# Patient Record
Sex: FEMALE | Race: WHITE | NOT HISPANIC OR LATINO | URBAN - METROPOLITAN AREA
[De-identification: names, ages, dates, MRNs, and addresses within clinical notes are randomized per-mention and may not be internally consistent; named-entity substitution may affect disease eponyms.]

---

## 2023-10-25 ENCOUNTER — CLINICAL SUPPORT (OUTPATIENT)
Dept: OBSTETRICS AND GYNECOLOGY | Facility: HOSPITAL | Age: 28
End: 2023-10-25
Payer: COMMERCIAL

## 2023-10-25 DIAGNOSIS — N92.6 MISSED MENSES: ICD-10-CM

## 2023-10-25 LAB — PREGNANCY TEST URINE, POC: POSITIVE

## 2023-10-25 ASSESSMENT — ENCOUNTER SYMPTOMS
MUSCULOSKELETAL NEGATIVE: 0
NEUROLOGICAL NEGATIVE: 0
CONSTITUTIONAL NEGATIVE: 0
PSYCHIATRIC NEGATIVE: 0
HEMATOLOGIC/LYMPHATIC NEGATIVE: 0
GASTROINTESTINAL NEGATIVE: 1
CARDIOVASCULAR NEGATIVE: 0
ENDOCRINE NEGATIVE: 0
RESPIRATORY NEGATIVE: 0
ALLERGIC/IMMUNOLOGIC NEGATIVE: 0
EYES NEGATIVE: 0

## 2023-10-25 NOTE — PROGRESS NOTES
Ms. Malagon presents today for a urine pregnancy test, she reports that her last menstrual period was 2023 but reports that they are irregular. She and her partner have been actively trying to conceive. She is a .    Her last unprotected sexual encounter was near the end of October.     She is not on birth control.     In office pregnancy test was positive.    Patient denies any vaginal bleeding or cramping, since her LMP.    Based off of today's findings the patient is approx 6w1d.    Pregnancy warning signs reviewed with patient scheduled for NOB visit on 11/10/2023.      Kacey WADEN, RN, CLC

## 2023-11-06 ENCOUNTER — HOSPITAL ENCOUNTER (EMERGENCY)
Facility: HOSPITAL | Age: 28
Discharge: HOME | End: 2023-11-06
Payer: COMMERCIAL

## 2023-11-06 VITALS
DIASTOLIC BLOOD PRESSURE: 64 MMHG | TEMPERATURE: 97.5 F | HEIGHT: 64 IN | RESPIRATION RATE: 16 BRPM | BODY MASS INDEX: 40.97 KG/M2 | HEART RATE: 88 BPM | OXYGEN SATURATION: 98 % | SYSTOLIC BLOOD PRESSURE: 100 MMHG | WEIGHT: 240 LBS

## 2023-11-06 DIAGNOSIS — J06.9 VIRAL UPPER RESPIRATORY TRACT INFECTION: Primary | ICD-10-CM

## 2023-11-06 DIAGNOSIS — O21.9 NAUSEA AND VOMITING IN PREGNANCY (HHS-HCC): ICD-10-CM

## 2023-11-06 LAB
ALBUMIN SERPL BCP-MCNC: 4.3 G/DL (ref 3.4–5)
ALP SERPL-CCNC: 74 U/L (ref 33–110)
ALT SERPL W P-5'-P-CCNC: 34 U/L (ref 7–45)
ANION GAP SERPL CALC-SCNC: 17 MMOL/L (ref 10–20)
APPEARANCE UR: ABNORMAL
AST SERPL W P-5'-P-CCNC: 20 U/L (ref 9–39)
BASOPHILS # BLD AUTO: 0.07 X10*3/UL (ref 0–0.1)
BASOPHILS NFR BLD AUTO: 0.6 %
BILIRUB SERPL-MCNC: 0.5 MG/DL (ref 0–1.2)
BILIRUB UR STRIP.AUTO-MCNC: NEGATIVE MG/DL
BUN SERPL-MCNC: 5 MG/DL (ref 6–23)
CALCIUM SERPL-MCNC: 9.4 MG/DL (ref 8.6–10.6)
CHLORIDE SERPL-SCNC: 104 MMOL/L (ref 98–107)
CO2 SERPL-SCNC: 21 MMOL/L (ref 21–32)
COLOR UR: YELLOW
CREAT SERPL-MCNC: 0.66 MG/DL (ref 0.5–1.05)
EOSINOPHIL # BLD AUTO: 0.74 X10*3/UL (ref 0–0.7)
EOSINOPHIL NFR BLD AUTO: 6.8 %
ERYTHROCYTE [DISTWIDTH] IN BLOOD BY AUTOMATED COUNT: 14.2 % (ref 11.5–14.5)
FLUAV RNA RESP QL NAA+PROBE: NOT DETECTED
FLUBV RNA RESP QL NAA+PROBE: NOT DETECTED
GFR SERPL CREATININE-BSD FRML MDRD: >90 ML/MIN/1.73M*2
GLUCOSE SERPL-MCNC: 90 MG/DL (ref 74–99)
GLUCOSE UR STRIP.AUTO-MCNC: NEGATIVE MG/DL
HCT VFR BLD AUTO: 42.2 % (ref 36–46)
HGB BLD-MCNC: 14.1 G/DL (ref 12–16)
HOLD SPECIMEN: NORMAL
IMM GRANULOCYTES # BLD AUTO: 0.03 X10*3/UL (ref 0–0.7)
IMM GRANULOCYTES NFR BLD AUTO: 0.3 % (ref 0–0.9)
KETONES UR STRIP.AUTO-MCNC: NEGATIVE MG/DL
LEUKOCYTE ESTERASE UR QL STRIP.AUTO: NEGATIVE
LYMPHOCYTES # BLD AUTO: 2.63 X10*3/UL (ref 1.2–4.8)
LYMPHOCYTES NFR BLD AUTO: 24.1 %
MCH RBC QN AUTO: 29 PG (ref 26–34)
MCHC RBC AUTO-ENTMCNC: 33.4 G/DL (ref 32–36)
MCV RBC AUTO: 87 FL (ref 80–100)
MONOCYTES # BLD AUTO: 0.64 X10*3/UL (ref 0.1–1)
MONOCYTES NFR BLD AUTO: 5.9 %
NEUTROPHILS # BLD AUTO: 6.79 X10*3/UL (ref 1.2–7.7)
NEUTROPHILS NFR BLD AUTO: 62.3 %
NITRITE UR QL STRIP.AUTO: NEGATIVE
NRBC BLD-RTO: 0 /100 WBCS (ref 0–0)
PH UR STRIP.AUTO: 6 [PH]
PLATELET # BLD AUTO: 372 X10*3/UL (ref 150–450)
POTASSIUM SERPL-SCNC: 3.9 MMOL/L (ref 3.5–5.3)
PROT SERPL-MCNC: 7.9 G/DL (ref 6.4–8.2)
PROT UR STRIP.AUTO-MCNC: NEGATIVE MG/DL
RBC # BLD AUTO: 4.86 X10*6/UL (ref 4–5.2)
RBC # UR STRIP.AUTO: NEGATIVE /UL
SARS-COV-2 RNA RESP QL NAA+PROBE: NOT DETECTED
SODIUM SERPL-SCNC: 138 MMOL/L (ref 136–145)
SP GR UR STRIP.AUTO: 1.02
UROBILINOGEN UR STRIP.AUTO-MCNC: <2 MG/DL
WBC # BLD AUTO: 10.9 X10*3/UL (ref 4.4–11.3)

## 2023-11-06 PROCEDURE — 99285 EMERGENCY DEPT VISIT HI MDM: CPT

## 2023-11-06 PROCEDURE — 96374 THER/PROPH/DIAG INJ IV PUSH: CPT

## 2023-11-06 PROCEDURE — 99284 EMERGENCY DEPT VISIT MOD MDM: CPT | Performed by: PHYSICIAN ASSISTANT

## 2023-11-06 PROCEDURE — 96361 HYDRATE IV INFUSION ADD-ON: CPT

## 2023-11-06 PROCEDURE — 2500000004 HC RX 250 GENERAL PHARMACY W/ HCPCS (ALT 636 FOR OP/ED): Mod: SE | Performed by: PHYSICIAN ASSISTANT

## 2023-11-06 PROCEDURE — 85025 COMPLETE CBC W/AUTO DIFF WBC: CPT | Performed by: PHYSICIAN ASSISTANT

## 2023-11-06 PROCEDURE — 87636 SARSCOV2 & INF A&B AMP PRB: CPT | Performed by: PHYSICIAN ASSISTANT

## 2023-11-06 PROCEDURE — 81003 URINALYSIS AUTO W/O SCOPE: CPT | Performed by: PHYSICIAN ASSISTANT

## 2023-11-06 PROCEDURE — 36415 COLL VENOUS BLD VENIPUNCTURE: CPT | Performed by: PHYSICIAN ASSISTANT

## 2023-11-06 PROCEDURE — 99284 EMERGENCY DEPT VISIT MOD MDM: CPT | Mod: 25

## 2023-11-06 PROCEDURE — 80053 COMPREHEN METABOLIC PANEL: CPT | Performed by: PHYSICIAN ASSISTANT

## 2023-11-06 RX ORDER — ONDANSETRON 4 MG/1
4 TABLET, ORALLY DISINTEGRATING ORAL EVERY 8 HOURS PRN
Qty: 30 TABLET | Refills: 0 | Status: ON HOLD | OUTPATIENT
Start: 2023-11-06 | End: 2024-02-21 | Stop reason: ALTCHOICE

## 2023-11-06 RX ORDER — IPRATROPIUM BROMIDE AND ALBUTEROL SULFATE 2.5; .5 MG/3ML; MG/3ML
3 SOLUTION RESPIRATORY (INHALATION) 4 TIMES DAILY PRN
Qty: 20 ML | Refills: 0 | Status: ON HOLD | OUTPATIENT
Start: 2023-11-06 | End: 2024-02-21 | Stop reason: ALTCHOICE

## 2023-11-06 RX ORDER — ONDANSETRON HYDROCHLORIDE 2 MG/ML
4 INJECTION, SOLUTION INTRAVENOUS ONCE
Status: COMPLETED | OUTPATIENT
Start: 2023-11-06 | End: 2023-11-06

## 2023-11-06 RX ORDER — ACETAMINOPHEN 325 MG/1
975 TABLET ORAL ONCE
Status: COMPLETED | OUTPATIENT
Start: 2023-11-06 | End: 2023-11-06

## 2023-11-06 RX ORDER — ACETAMINOPHEN 325 MG/1
650 TABLET ORAL EVERY 6 HOURS PRN
Qty: 30 TABLET | Refills: 0 | Status: SHIPPED | OUTPATIENT
Start: 2023-11-06 | End: 2023-11-10 | Stop reason: ALTCHOICE

## 2023-11-06 RX ORDER — ACETAMINOPHEN 325 MG/1
TABLET ORAL
Status: COMPLETED
Start: 2023-11-06 | End: 2023-11-06

## 2023-11-06 RX ADMIN — SODIUM CHLORIDE, POTASSIUM CHLORIDE, SODIUM LACTATE AND CALCIUM CHLORIDE 1000 ML: 600; 310; 30; 20 INJECTION, SOLUTION INTRAVENOUS at 18:55

## 2023-11-06 RX ADMIN — ACETAMINOPHEN 325 MG: 325 TABLET ORAL at 18:58

## 2023-11-06 RX ADMIN — ONDANSETRON 4 MG: 2 INJECTION INTRAMUSCULAR; INTRAVENOUS at 18:57

## 2023-11-06 RX ADMIN — ACETAMINOPHEN 975 MG: 325 TABLET ORAL at 18:55

## 2023-11-06 ASSESSMENT — PAIN SCALES - GENERAL: PAINLEVEL_OUTOF10: 0 - NO PAIN

## 2023-11-06 ASSESSMENT — LIFESTYLE VARIABLES
HAVE PEOPLE ANNOYED YOU BY CRITICIZING YOUR DRINKING: NO
EVER HAD A DRINK FIRST THING IN THE MORNING TO STEADY YOUR NERVES TO GET RID OF A HANGOVER: NO
HAVE YOU EVER FELT YOU SHOULD CUT DOWN ON YOUR DRINKING: NO
EVER FELT BAD OR GUILTY ABOUT YOUR DRINKING: NO
REASON UNABLE TO ASSESS: NO

## 2023-11-06 ASSESSMENT — COLUMBIA-SUICIDE SEVERITY RATING SCALE - C-SSRS
6. HAVE YOU EVER DONE ANYTHING, STARTED TO DO ANYTHING, OR PREPARED TO DO ANYTHING TO END YOUR LIFE?: NO
2. HAVE YOU ACTUALLY HAD ANY THOUGHTS OF KILLING YOURSELF?: NO
1. IN THE PAST MONTH, HAVE YOU WISHED YOU WERE DEAD OR WISHED YOU COULD GO TO SLEEP AND NOT WAKE UP?: NO

## 2023-11-06 ASSESSMENT — PAIN - FUNCTIONAL ASSESSMENT: PAIN_FUNCTIONAL_ASSESSMENT: 0-10

## 2023-11-06 NOTE — ED PROVIDER NOTES
HPI:  28-year-old female  at 7 weeks gestational age presenting for flulike symptoms.  She has malaise, myalgias, fevers and chills, headaches.  Denies any recent travel or sick contacts.  Denies any abdominal pain, vaginal bleeding, dysuria or urinary frequency.  States she has had some nausea and vomiting without abdominal pains.  States she has not yet been to her first OB appointment.      Physical Exam:   GEN: Vitals noted. NAD  EYES:  EOMs grossly intact, anicteric sclera  CATHI: Mucosa moist.  No tonsillar swelling erythema or exudate  NECK: Supple.  No nuchal rigidity  CARD: RRR  PULMONARY: Moving air well. Clear all lung fields.  ABDOMEN: Soft, no guarding, no rigidity. Nontender. NABS  EXTREMITIES: Full ROM, no pitting edema,   SKIN: Intact, warm and dry  NEURO: Alert and oriented x 3, speech is clear, no obvious deficits noted.  Cranial nerves II through XII intact, nystagmus, negative Romberg, nontoxic gait, intact sensation strength all 4 extremities, good finger-nose-finger, negative cardiac emergency sign      ----------------------------------------------------------------------------------------------------------------------------    MDM:  20-year-old female presenting for flulike symptoms.  On exam she is nontoxic ambulating without difficulty.  Vital signs stable clued afebrile with no tachycardia proximal hypotension.  Lungs CTA BL.  CV RRR.  ABD soft NTTP.  We will give her some fluids and check for COVID-19 and influenza.  Will check urine as well and provide symptomatic support.  Laboratory work was without leukocytosis or anemia, normal electrolytes, negative COVID-19 and influenza, no signs UTI.  On reevaluation she states she feels improved and would like to be discharged home.  She is tolerating p.o. well without difficulty.  She is discharged with prescription for Tylenol and Zofran.  Told to follow-up with her OB/GYN.  Return precautions reviewed.    No orders to display     Labs  Reviewed   CBC WITH AUTO DIFFERENTIAL - Abnormal       Result Value    WBC 10.9      nRBC 0.0      RBC 4.86      Hemoglobin 14.1      Hematocrit 42.2      MCV 87      MCH 29.0      MCHC 33.4      RDW 14.2      Platelets 372      Neutrophils % 62.3      Immature Granulocytes %, Automated 0.3      Lymphocytes % 24.1      Monocytes % 5.9      Eosinophils % 6.8      Basophils % 0.6      Neutrophils Absolute 6.79      Immature Granulocytes Absolute, Automated 0.03      Lymphocytes Absolute 2.63      Monocytes Absolute 0.64      Eosinophils Absolute 0.74 (*)     Basophils Absolute 0.07     COMPREHENSIVE METABOLIC PANEL - Abnormal    Glucose 90      Sodium 138      Potassium 3.9      Chloride 104      Bicarbonate 21      Anion Gap 17      Urea Nitrogen 5 (*)     Creatinine 0.66      eGFR >90      Calcium 9.4      Albumin 4.3      Alkaline Phosphatase 74      Total Protein 7.9      AST 20      Bilirubin, Total 0.5      ALT 34     URINALYSIS WITH REFLEX MICROSCOPIC AND CULTURE - Abnormal    Color, Urine Yellow      Appearance, Urine Hazy (*)     Specific Gravity, Urine 1.017      pH, Urine 6.0      Protein, Urine NEGATIVE      Glucose, Urine NEGATIVE      Blood, Urine NEGATIVE      Ketones, Urine NEGATIVE      Bilirubin, Urine NEGATIVE      Urobilinogen, Urine <2.0      Nitrite, Urine NEGATIVE      Leukocyte Esterase, Urine NEGATIVE     INFLUENZA A AND B PCR - Normal    Flu A Result Not Detected      Flu B Result Not Detected      Narrative:     This assay is an in vitro diagnostic multiplex nucleic acid amplification test for the detection and discrimination of Influenza A & B from nasopharyngeal specimens, and has been validated for use at [facility]. Negative results do not preclude Influenza A/B infections, and should not be used as the sole basis for diagnosis, treatment, or other management decisions. If Influenza A/B and RSV PCR results are negative, testing for Parainfluenza virus, Adenovirus  and Metapneumovirus is routinely performed for [facility] and [facility] care inpatients, and is available on other patients by placing an add-on request.   SARS-COV-2 PCR, SYMPTOMATIC - Normal    Coronavirus 2019, PCR Not Detected      Narrative:     This assay has received FDA Emergency Use Authorization (EUA) and is only authorized for the duration of time that circumstances exist to justify the authorization of the emergency use of in vitro diagnostic tests for the detection of SARS-CoV-2 virus and/or diagnosis of COVID-19 infection under section 564(b)(1) of the Act, 21 U.S.C. 360bbb-3(b)(1). This assay is an in vitro diagnostic nucleic acid amplification test for the qualitative detection of SARS-CoV-2 from nasopharyngeal specimens and has been validated for use at [facility]. Negative results do not preclude COVID-19 infections and should not be used as the sole basis for diagnosis, treatment, or other management decisions.     URINALYSIS WITH REFLEX MICROSCOPIC AND CULTURE    Narrative:     The following orders were created for panel order Urinalysis with Reflex Microscopic and Culture.  Procedure                               Abnormality         Status                     ---------                               -----------         ------                     Urinalysis with Reflex M...[[id number]]  Abnormal            Final result               Extra Urine Gray Tube[[id number]]                            In process                   Please view results for these tests on the individual orders.   EXTRA URINE GRAY TUBE     Diagnoses as of 11/06/23 2029   Viral upper respiratory tract infection   Nausea and vomiting in pregnancy     ----------------------------------------------------------------------------------------------------------------------------    This note was dictated using a speech recognition program.  While an attempt was made at proof reading to minimize errors,  minor errors in transcription may be present call for questions.     Tin Ortiz PA-C  11/06/23 2029

## 2023-11-07 PROBLEM — N92.6 MISSED MENSES: Status: RESOLVED | Noted: 2023-10-25 | Resolved: 2023-11-07

## 2023-11-07 NOTE — DISCHARGE INSTRUCTIONS
Follow-up with your OB/GYN, call [phone] to schedule appointment.  Your COVID-19 and influenza test came back negative.  It is important stay well-hydrated, take the Zofran as needed.  Take Tylenol as needed.

## 2023-11-07 NOTE — PROGRESS NOTES
Brian Malagon is a 28 y.o.  at 7w by US (after today's visit) who presents for an initial prenatal visit. Pt notes she is nervous about pregnancy because of her history of recurrent losses, and would like to get an US as soon as possible.     Patient currently experiencing: nausea/vomitting, was given zofran during a previous ER one week ago, but notes that she has not taken it and will likely not take it because she does not like taking medicine. She reports occasional tension, headaches and neck stiffness. She was seen in the ER last week for, lightheadedness and cold sweats, was dx with URI, and reports that her symptoms have resolved    Bleeding or cramping since LMP: yes - mild cramping, period feeling cramps     Ultrasound completed this pregnancy: 11/10 after visit     Taking prenatal vitamin: Yes    Last pap: , wnl     OB History    Para Term  AB Living   5 1 1   3 1   SAB IAB Ectopic Multiple Live Births   3       1      # Outcome Date GA Lbr Ham/2nd Weight Sex Delivery Anes PTL Lv   5 Current            4 SAB 10/2022           3 Term 18 39w0d  3289 g F Vag-Spont EPI  DAVID   2 SAB  11w0d             Complications: Quiles syndrome   1 SAB  21w0d       ND        Prior pregnancy complications:   Pt's first pregnancy - IUFD at 21 weeks req   Pt's previous provider said that  SAB was found to have Turners syndrome  No complications with her full term pregnancy in 2018    History of hypertension:  No    Past Medical History:   Diagnosis Date    Asthma       Past Surgical History:   Procedure Laterality Date    CHOLECYSTECTOMY      D&C FIRST TRIMESTER / TX INCOMPLETE / MISSED / SEPTIC / INDUCED       D&E SECOND TRIMESTER        Social History     Tobacco Use    Smoking status: Never    Smokeless tobacco: Never   Vaping Use    Vaping Use: Never used   Substance Use Topics    Alcohol use: Not Currently    Drug use: Never        Objective   Physical  Exam  Weight: 110 kg (242 lb)  Pregravid BMI: Could not be calculated  BP: 105/72    Physical Exam  Vitals reviewed.   Constitutional:       Appearance: Normal appearance.   HENT:      Head: Normocephalic.   Pulmonary:      Effort: Pulmonary effort is normal.   Skin:     General: Skin is warm.   Neurological:      Mental Status: She is alert.   Psychiatric:         Mood and Affect: Mood normal.         Behavior: Behavior normal.         Thought Content: Thought content normal.         Judgment: Judgment normal.         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,X  Genetic counseling referral placed          Current Assessment & Plan     Genetics referral placed          Asthma    Overview       Requiring oral steroid medication   Uses albuterol inhaler 1-2x week, notes that she has to use more frequently when she doesn't take her inhaled steroid   Denies history of intubation              Obesity in pregnancy    Overview       Pre-preg BMI 41  Discussed recommended weight gain to 11-20 lbs   Recommended initiation of bASA at 12 wga, pt is amenable         History of IUFD    Overview     Per genetics note in 2018:   Patient's first pregnancy in 2012 resulted in an intrauterine fetal demise at 21-4/7 weeks' gestation. Fetal anatomy ultrasound at 20-3/7 weeks gestation noted a six-day growth lag, low fluid, and a thick placenta. Karyotype was performed and noted to be normal female (46,XX) which was consistent with ultrasound reported gender; however pathology report indicated a male fetus. Therefore it is unclear whether there may have been discordant gender. Anticardiolipin antibody testing, DRVVT screen, and parvovirus testing was all negative.         Current Assessment & Plan     [facility] referral placed at [facility]   Genetics referrals placed          Relevant Orders    Referral to Prenatal Genetics     Nausea and vomiting in pregnancy prior to 22 weeks gestation    Overview     Rx sent for unisom and B6         Relevant Medications    doxylamine (Unisom, doxylamine,) 25 mg tablet    pyridoxine (Vitamin B-6) 25 mg tablet    Supervision of high risk pregnancy in first trimester - Primary    Relevant Medications    prenatal vitamin, iron-folic, 27 mg iron-800 mcg folic acid tablet    Other Relevant Orders    C. Trachomatis / N. Gonorrhoeae, Amplified Detection    CBC Anemia Panel With Reflex,Pregnancy    Hemoglobin A1C    Hepatitis B Surface Antigen    Hepatitis C Antibody    HIV 1/2 Antigen/Antibody Screen with Reflex to Confirmation    Rubella Antibody, IgG    TRICH VAGINALIS, AMPLIFIED    Syphilis Screen with Reflex    Type And Screen    Varicella Zoster Antibody, IgG    Referral to Maternal Fetal Medicine    POC pregnancy, urine (Completed)    Urine Culture       Plan   NOB plan: New OB resources provided and reviewed with particular attention to dietary, travel, and medication restrictions  Oriented to practice, discussed need for only MD care given hx of 21 week loss   Reviewed bleeding precautions, warning signs, when to call provider; phone number provided  Discussed bASA for PEC prophylaxis starting at 12 weeks   Routine NOB labs ordered  Discussed Centering Pregnancy with patient, pt declined   Transfer care to MD due to history of recurrent pregnancy loss   Reviewed reasons to call CNM on-call: vaginal bleeding, strong pelvic pain, or any questions/concerns  RTC in 4 weeks or prn  *Next visit:  Flu vaccine     ISHAN Carrillo APRN-CNM

## 2023-11-09 PROBLEM — T78.03XA SEAFOOD ALLERGY, ANAPHYLAXIS: Status: ACTIVE | Noted: 2017-04-11

## 2023-11-09 PROBLEM — O03.9 SPONTANEOUS ABORTION (HHS-HCC): Status: ACTIVE | Noted: 2023-11-09

## 2023-11-09 PROBLEM — O09.299: Status: ACTIVE | Noted: 2023-11-09

## 2023-11-09 PROBLEM — N93.9 ABNORMAL UTERINE BLEEDING (AUB): Status: ACTIVE | Noted: 2023-11-09

## 2023-11-09 PROBLEM — N96 RECURRENT PREGNANCY LOSS WITHOUT CURRENT PREGNANCY: Status: ACTIVE | Noted: 2023-11-09

## 2023-11-09 PROBLEM — N96 RECURRENT PREGNANCY LOSS: Status: ACTIVE | Noted: 2022-10-21

## 2023-11-09 PROBLEM — Z98.890 S/P DILATATION AND CURETTAGE: Status: ACTIVE | Noted: 2023-11-09

## 2023-11-09 PROBLEM — E66.01 MORBID (SEVERE) OBESITY DUE TO EXCESS CALORIES (MULTI): Status: ACTIVE | Noted: 2022-09-15

## 2023-11-09 PROBLEM — O02.1 MISSED ABORTION (HHS-HCC): Status: ACTIVE | Noted: 2023-11-09

## 2023-11-09 PROBLEM — F17.200 TOBACCO USE DISORDER: Status: ACTIVE | Noted: 2018-07-14

## 2023-11-09 RX ORDER — MOMETASONE FUROATE AND FORMOTEROL FUMARATE DIHYDRATE 200; 5 UG/1; UG/1
2 AEROSOL RESPIRATORY (INHALATION) 2 TIMES DAILY
COMMUNITY
Start: 2020-10-26

## 2023-11-09 RX ORDER — ALBUTEROL SULFATE 0.83 MG/ML
SOLUTION RESPIRATORY (INHALATION)
COMMUNITY
Start: 2020-10-26

## 2023-11-09 RX ORDER — ALBUTEROL SULFATE 90 UG/1
2 AEROSOL, METERED RESPIRATORY (INHALATION) EVERY 6 HOURS PRN
COMMUNITY
Start: 2016-01-23

## 2023-11-09 RX ORDER — PRENATAL VIT NO.126/IRON/FOLIC 28MG-0.8MG
1 TABLET ORAL DAILY
Status: ON HOLD | COMMUNITY
Start: 2022-08-31 | End: 2024-02-21 | Stop reason: ALTCHOICE

## 2023-11-09 RX ORDER — EPINEPHRINE 0.3 MG/.3ML
0.3 INJECTION SUBCUTANEOUS AS NEEDED
COMMUNITY
Start: 2017-04-11

## 2023-11-09 RX ORDER — PNV NO.95/FERROUS FUM/FOLIC AC 28MG-0.8MG
TABLET ORAL
COMMUNITY
Start: 2022-11-20 | End: 2024-01-17 | Stop reason: WASHOUT

## 2023-11-09 RX ORDER — IBUPROFEN 800 MG/1
TABLET ORAL
COMMUNITY
Start: 2022-10-07 | End: 2023-11-10 | Stop reason: ALTCHOICE

## 2023-11-09 RX ORDER — CETIRIZINE HYDROCHLORIDE 10 MG/1
1 TABLET ORAL DAILY
COMMUNITY
Start: 2023-06-16

## 2023-11-10 ENCOUNTER — HOSPITAL ENCOUNTER (OUTPATIENT)
Dept: RADIOLOGY | Facility: HOSPITAL | Age: 28
Discharge: HOME | End: 2023-11-10
Payer: COMMERCIAL

## 2023-11-10 ENCOUNTER — INITIAL PRENATAL (OUTPATIENT)
Dept: OBSTETRICS AND GYNECOLOGY | Facility: HOSPITAL | Age: 28
End: 2023-11-10
Payer: COMMERCIAL

## 2023-11-10 VITALS — SYSTOLIC BLOOD PRESSURE: 105 MMHG | DIASTOLIC BLOOD PRESSURE: 72 MMHG | WEIGHT: 242 LBS | BODY MASS INDEX: 41.54 KG/M2

## 2023-11-10 DIAGNOSIS — O36.80X0 PREGNANCY WITH INCONCLUSIVE FETAL VIABILITY (HHS-HCC): ICD-10-CM

## 2023-11-10 DIAGNOSIS — O21.9 NAUSEA AND VOMITING IN PREGNANCY PRIOR TO 22 WEEKS GESTATION (HHS-HCC): ICD-10-CM

## 2023-11-10 DIAGNOSIS — O99.210 OBESITY IN PREGNANCY (HHS-HCC): ICD-10-CM

## 2023-11-10 DIAGNOSIS — O09.299: ICD-10-CM

## 2023-11-10 DIAGNOSIS — J45.40 MODERATE PERSISTENT ASTHMA, UNSPECIFIED WHETHER COMPLICATED (HHS-HCC): ICD-10-CM

## 2023-11-10 DIAGNOSIS — Z34.91 INITIAL OBSTETRIC VISIT IN FIRST TRIMESTER (HHS-HCC): ICD-10-CM

## 2023-11-10 DIAGNOSIS — O09.91 SUPERVISION OF HIGH RISK PREGNANCY IN FIRST TRIMESTER (HHS-HCC): Primary | ICD-10-CM

## 2023-11-10 DIAGNOSIS — Z87.59 HISTORY OF IUFD: ICD-10-CM

## 2023-11-10 PROBLEM — N96 HISTORY OF RECURRENT MISCARRIAGES: Status: ACTIVE | Noted: 2023-11-10

## 2023-11-10 PROBLEM — J45.909 ASTHMA (HHS-HCC): Status: ACTIVE | Noted: 2023-11-10

## 2023-11-10 PROBLEM — O00.01: Status: ACTIVE | Noted: 2023-11-10

## 2023-11-10 PROBLEM — O02.1 MISSED ABORTION (HHS-HCC): Status: RESOLVED | Noted: 2023-11-09 | Resolved: 2023-11-10

## 2023-11-10 PROBLEM — O26.20 HISTORY OF RECURRENT ABORTION, CURRENTLY PREGNANT (HHS-HCC): Status: ACTIVE | Noted: 2023-11-10

## 2023-11-10 PROBLEM — N96 RECURRENT PREGNANCY LOSS WITHOUT CURRENT PREGNANCY: Status: RESOLVED | Noted: 2023-11-09 | Resolved: 2023-11-10

## 2023-11-10 PROBLEM — N93.9 ABNORMAL UTERINE BLEEDING (AUB): Status: RESOLVED | Noted: 2023-11-09 | Resolved: 2023-11-10

## 2023-11-10 PROBLEM — Z98.890 S/P DILATATION AND CURETTAGE: Status: RESOLVED | Noted: 2023-11-09 | Resolved: 2023-11-10

## 2023-11-10 PROBLEM — F17.200 TOBACCO USE DISORDER: Status: RESOLVED | Noted: 2018-07-14 | Resolved: 2023-11-10

## 2023-11-10 PROBLEM — O03.9 SPONTANEOUS ABORTION (HHS-HCC): Status: RESOLVED | Noted: 2023-11-09 | Resolved: 2023-11-10

## 2023-11-10 PROBLEM — O36.4XX0: Status: ACTIVE | Noted: 2023-11-10

## 2023-11-10 LAB — PREGNANCY TEST URINE, POC: POSITIVE

## 2023-11-10 PROCEDURE — 87661 TRICHOMONAS VAGINALIS AMPLIF: CPT | Mod: 59 | Performed by: ADVANCED PRACTICE MIDWIFE

## 2023-11-10 PROCEDURE — 87086 URINE CULTURE/COLONY COUNT: CPT | Performed by: ADVANCED PRACTICE MIDWIFE

## 2023-11-10 PROCEDURE — 99214 OFFICE O/P EST MOD 30 MIN: CPT | Performed by: ADVANCED PRACTICE MIDWIFE

## 2023-11-10 PROCEDURE — 76817 TRANSVAGINAL US OBSTETRIC: CPT

## 2023-11-10 PROCEDURE — 76801 OB US < 14 WKS SINGLE FETUS: CPT | Performed by: OBSTETRICS & GYNECOLOGY

## 2023-11-10 PROCEDURE — 76801 OB US < 14 WKS SINGLE FETUS: CPT

## 2023-11-10 PROCEDURE — 76817 TRANSVAGINAL US OBSTETRIC: CPT | Performed by: OBSTETRICS & GYNECOLOGY

## 2023-11-10 PROCEDURE — 87800 DETECT AGNT MULT DNA DIREC: CPT | Performed by: ADVANCED PRACTICE MIDWIFE

## 2023-11-10 RX ORDER — PYRIDOXINE HCL (VITAMIN B6) 25 MG
25 TABLET ORAL 4 TIMES DAILY PRN
Qty: 30 TABLET | Refills: 1 | Status: SHIPPED | OUTPATIENT
Start: 2023-11-10 | End: 2023-12-10

## 2023-11-10 ASSESSMENT — EDINBURGH POSTNATAL DEPRESSION SCALE (EPDS)
I HAVE BEEN SO UNHAPPY THAT I HAVE HAD DIFFICULTY SLEEPING: NOT AT ALL
THINGS HAVE BEEN GETTING ON TOP OF ME: NO, I HAVE BEEN COPING AS WELL AS EVER
I HAVE LOOKED FORWARD WITH ENJOYMENT TO THINGS: AS MUCH AS I EVER DID
I HAVE BEEN SO UNHAPPY THAT I HAVE BEEN CRYING: ONLY OCCASIONALLY
THE THOUGHT OF HARMING MYSELF HAS OCCURRED TO ME: NEVER
I HAVE BLAMED MYSELF UNNECESSARILY WHEN THINGS WENT WRONG: NO, NEVER
I HAVE FELT SAD OR MISERABLE: NO, NOT AT ALL
I HAVE FELT SCARED OR PANICKY FOR NO GOOD REASON: NO, NOT AT ALL
I HAVE BEEN ANXIOUS OR WORRIED FOR NO GOOD REASON: NO, NOT AT ALL

## 2023-11-11 LAB
C TRACH RRNA SPEC QL NAA+PROBE: NEGATIVE
N GONORRHOEA DNA SPEC QL PROBE+SIG AMP: NEGATIVE
T VAGINALIS RRNA SPEC QL NAA+PROBE: NEGATIVE

## 2023-11-12 LAB — BACTERIA UR CULT: NO GROWTH

## 2023-11-27 ENCOUNTER — LAB (OUTPATIENT)
Dept: LAB | Facility: LAB | Age: 28
End: 2023-11-27
Payer: COMMERCIAL

## 2023-11-27 ENCOUNTER — HOSPITAL ENCOUNTER (OUTPATIENT)
Dept: RADIOLOGY | Facility: HOSPITAL | Age: 28
Discharge: HOME | End: 2023-11-27
Payer: COMMERCIAL

## 2023-11-27 DIAGNOSIS — O09.91 SUPERVISION OF HIGH RISK PREGNANCY IN FIRST TRIMESTER (HHS-HCC): ICD-10-CM

## 2023-11-27 DIAGNOSIS — Z36.82 NUCHAL TRANSLUCENCY OF FETUS ON PRENATAL ULTRASOUND (HHS-HCC): ICD-10-CM

## 2023-11-27 LAB
ABO GROUP (TYPE) IN BLOOD: NORMAL
ANTIBODY SCREEN: NORMAL
ERYTHROCYTE [DISTWIDTH] IN BLOOD BY AUTOMATED COUNT: 13.6 % (ref 11.5–14.5)
EST. AVERAGE GLUCOSE BLD GHB EST-MCNC: 97 MG/DL
HBA1C MFR BLD: 5 %
HBV SURFACE AG SERPL QL IA: NONREACTIVE
HCT VFR BLD AUTO: 43.3 % (ref 36–46)
HCV AB SER QL: NONREACTIVE
HGB BLD-MCNC: 14.3 G/DL (ref 12–16)
HIV 1+2 AB+HIV1 P24 AG SERPL QL IA: NONREACTIVE
MCH RBC QN AUTO: 29.4 PG (ref 26–34)
MCHC RBC AUTO-ENTMCNC: 33 G/DL (ref 32–36)
MCV RBC AUTO: 89 FL (ref 80–100)
NRBC BLD-RTO: 0 /100 WBCS (ref 0–0)
PLATELET # BLD AUTO: 393 X10*3/UL (ref 150–450)
RBC # BLD AUTO: 4.86 X10*6/UL (ref 4–5.2)
REFLEX ADDED, ANEMIA PANEL: NORMAL
RH FACTOR (ANTIGEN D): NORMAL
RUBV IGG SERPL IA-ACNC: 0.5 IA
RUBV IGG SERPL QL IA: NEGATIVE
T PALLIDUM AB SER QL: NONREACTIVE
VARICELLA ZOSTER IGG INDEX: 0.5 IA
VZV IGG SER QL IA: NEGATIVE
WBC # BLD AUTO: 11.7 X10*3/UL (ref 4.4–11.3)

## 2023-11-27 PROCEDURE — 87340 HEPATITIS B SURFACE AG IA: CPT

## 2023-11-27 PROCEDURE — 86317 IMMUNOASSAY INFECTIOUS AGENT: CPT

## 2023-11-27 PROCEDURE — 86803 HEPATITIS C AB TEST: CPT

## 2023-11-27 PROCEDURE — 83036 HEMOGLOBIN GLYCOSYLATED A1C: CPT

## 2023-11-27 PROCEDURE — 86850 RBC ANTIBODY SCREEN: CPT

## 2023-11-27 PROCEDURE — 86901 BLOOD TYPING SEROLOGIC RH(D): CPT

## 2023-11-27 PROCEDURE — 86780 TREPONEMA PALLIDUM: CPT

## 2023-11-27 PROCEDURE — 87389 HIV-1 AG W/HIV-1&-2 AB AG IA: CPT

## 2023-11-27 PROCEDURE — 85027 COMPLETE CBC AUTOMATED: CPT

## 2023-11-27 PROCEDURE — 86787 VARICELLA-ZOSTER ANTIBODY: CPT

## 2023-11-27 PROCEDURE — 36415 COLL VENOUS BLD VENIPUNCTURE: CPT

## 2023-11-27 PROCEDURE — 76801 OB US < 14 WKS SINGLE FETUS: CPT

## 2023-11-27 PROCEDURE — 76801 OB US < 14 WKS SINGLE FETUS: CPT | Performed by: OBSTETRICS & GYNECOLOGY

## 2023-11-27 PROCEDURE — 86900 BLOOD TYPING SEROLOGIC ABO: CPT

## 2023-12-05 ENCOUNTER — CLINICAL SUPPORT (OUTPATIENT)
Dept: GENETICS | Facility: HOSPITAL | Age: 28
End: 2023-12-05
Payer: COMMERCIAL

## 2023-12-05 ENCOUNTER — LAB (OUTPATIENT)
Dept: LAB | Facility: LAB | Age: 28
End: 2023-12-05
Payer: COMMERCIAL

## 2023-12-05 VITALS
WEIGHT: 240 LBS | BODY MASS INDEX: 40.97 KG/M2 | HEIGHT: 64 IN | SYSTOLIC BLOOD PRESSURE: 110 MMHG | DIASTOLIC BLOOD PRESSURE: 74 MMHG

## 2023-12-05 DIAGNOSIS — Z13.79 ENCOUNTER FOR GENETIC SCREENING: ICD-10-CM

## 2023-12-05 PROCEDURE — 36415 COLL VENOUS BLD VENIPUNCTURE: CPT

## 2023-12-05 PROCEDURE — GENMD PR GENETICS VISIT (MEDICAID/MEDICARE): Performed by: GENETIC COUNSELOR, MS

## 2023-12-05 ASSESSMENT — PAIN SCALES - GENERAL: PAINLEVEL: 0-NO PAIN

## 2023-12-08 ENCOUNTER — HOSPITAL ENCOUNTER (EMERGENCY)
Facility: HOSPITAL | Age: 28
Discharge: HOME | End: 2023-12-08
Payer: COMMERCIAL

## 2023-12-08 VITALS
WEIGHT: 240 LBS | BODY MASS INDEX: 40.97 KG/M2 | HEIGHT: 64 IN | RESPIRATION RATE: 16 BRPM | TEMPERATURE: 98 F | HEART RATE: 77 BPM | DIASTOLIC BLOOD PRESSURE: 67 MMHG | OXYGEN SATURATION: 99 % | SYSTOLIC BLOOD PRESSURE: 127 MMHG

## 2023-12-08 DIAGNOSIS — O20.9 VAGINAL BLEEDING AFFECTING EARLY PREGNANCY (HHS-HCC): Primary | ICD-10-CM

## 2023-12-08 LAB
ABO GROUP (TYPE) IN BLOOD: NORMAL
ALBUMIN SERPL BCP-MCNC: 4.2 G/DL (ref 3.4–5)
ALP SERPL-CCNC: 71 U/L (ref 33–110)
ALT SERPL W P-5'-P-CCNC: 30 U/L (ref 7–45)
ANION GAP SERPL CALC-SCNC: 14 MMOL/L (ref 10–20)
ANTIBODY SCREEN: NORMAL
APPEARANCE UR: ABNORMAL
AST SERPL W P-5'-P-CCNC: 21 U/L (ref 9–39)
B-HCG SERPL-ACNC: ABNORMAL MIU/ML
BASOPHILS # BLD AUTO: 0.03 X10*3/UL (ref 0–0.1)
BASOPHILS NFR BLD AUTO: 0.3 %
BILIRUB SERPL-MCNC: 0.4 MG/DL (ref 0–1.2)
BILIRUB UR STRIP.AUTO-MCNC: NEGATIVE MG/DL
BUN SERPL-MCNC: 6 MG/DL (ref 6–23)
CALCIUM SERPL-MCNC: 9.9 MG/DL (ref 8.6–10.6)
CAOX CRY #/AREA UR COMP ASSIST: ABNORMAL /HPF
CHLORIDE SERPL-SCNC: 104 MMOL/L (ref 98–107)
CLUE CELLS SPEC QL WET PREP: NORMAL
CO2 SERPL-SCNC: 24 MMOL/L (ref 21–32)
COLOR UR: YELLOW
CREAT SERPL-MCNC: 0.53 MG/DL (ref 0.5–1.05)
EOSINOPHIL # BLD AUTO: 0.47 X10*3/UL (ref 0–0.7)
EOSINOPHIL NFR BLD AUTO: 4.1 %
ERYTHROCYTE [DISTWIDTH] IN BLOOD BY AUTOMATED COUNT: 13.6 % (ref 11.5–14.5)
GFR SERPL CREATININE-BSD FRML MDRD: >90 ML/MIN/1.73M*2
GLUCOSE SERPL-MCNC: 82 MG/DL (ref 74–99)
GLUCOSE UR STRIP.AUTO-MCNC: NEGATIVE MG/DL
HCT VFR BLD AUTO: 41.9 % (ref 36–46)
HGB BLD-MCNC: 14 G/DL (ref 12–16)
HOLD SPECIMEN: NORMAL
IMM GRANULOCYTES # BLD AUTO: 0.16 X10*3/UL (ref 0–0.7)
IMM GRANULOCYTES NFR BLD AUTO: 1.4 % (ref 0–0.9)
KETONES UR STRIP.AUTO-MCNC: NEGATIVE MG/DL
LEUKOCYTE ESTERASE UR QL STRIP.AUTO: NEGATIVE
LIPASE SERPL-CCNC: 21 U/L (ref 9–82)
LYMPHOCYTES # BLD AUTO: 2.52 X10*3/UL (ref 1.2–4.8)
LYMPHOCYTES NFR BLD AUTO: 22.2 %
MCH RBC QN AUTO: 29.7 PG (ref 26–34)
MCHC RBC AUTO-ENTMCNC: 33.4 G/DL (ref 32–36)
MCV RBC AUTO: 89 FL (ref 80–100)
MONOCYTES # BLD AUTO: 0.61 X10*3/UL (ref 0.1–1)
MONOCYTES NFR BLD AUTO: 5.4 %
MUCOUS THREADS #/AREA URNS AUTO: ABNORMAL /LPF
NEUTROPHILS # BLD AUTO: 7.55 X10*3/UL (ref 1.2–7.7)
NEUTROPHILS NFR BLD AUTO: 66.6 %
NITRITE UR QL STRIP.AUTO: NEGATIVE
NRBC BLD-RTO: 0 /100 WBCS (ref 0–0)
PH UR STRIP.AUTO: 5 [PH]
PLATELET # BLD AUTO: 346 X10*3/UL (ref 150–450)
POTASSIUM SERPL-SCNC: 4 MMOL/L (ref 3.5–5.3)
PROT SERPL-MCNC: 7.9 G/DL (ref 6.4–8.2)
PROT UR STRIP.AUTO-MCNC: NEGATIVE MG/DL
RBC # BLD AUTO: 4.71 X10*6/UL (ref 4–5.2)
RBC # UR STRIP.AUTO: ABNORMAL /UL
RBC #/AREA URNS AUTO: >20 /HPF
RH FACTOR (ANTIGEN D): NORMAL
SODIUM SERPL-SCNC: 138 MMOL/L (ref 136–145)
SP GR UR STRIP.AUTO: 1.02
SQUAMOUS #/AREA URNS AUTO: ABNORMAL /HPF
T VAGINALIS SPEC QL WET PREP: NORMAL
UROBILINOGEN UR STRIP.AUTO-MCNC: <2 MG/DL
WBC # BLD AUTO: 11.3 X10*3/UL (ref 4.4–11.3)
WBC #/AREA URNS AUTO: ABNORMAL /HPF
WBC VAG QL WET PREP: NORMAL
YEAST VAG QL WET PREP: NORMAL

## 2023-12-08 PROCEDURE — 85025 COMPLETE CBC W/AUTO DIFF WBC: CPT

## 2023-12-08 PROCEDURE — 84702 CHORIONIC GONADOTROPIN TEST: CPT

## 2023-12-08 PROCEDURE — 87210 SMEAR WET MOUNT SALINE/INK: CPT

## 2023-12-08 PROCEDURE — 99284 EMERGENCY DEPT VISIT MOD MDM: CPT

## 2023-12-08 PROCEDURE — 83690 ASSAY OF LIPASE: CPT

## 2023-12-08 PROCEDURE — 36415 COLL VENOUS BLD VENIPUNCTURE: CPT

## 2023-12-08 PROCEDURE — 80053 COMPREHEN METABOLIC PANEL: CPT

## 2023-12-08 PROCEDURE — 99283 EMERGENCY DEPT VISIT LOW MDM: CPT | Mod: 25

## 2023-12-08 PROCEDURE — 81001 URINALYSIS AUTO W/SCOPE: CPT

## 2023-12-08 PROCEDURE — 99285 EMERGENCY DEPT VISIT HI MDM: CPT

## 2023-12-08 PROCEDURE — 86900 BLOOD TYPING SEROLOGIC ABO: CPT

## 2023-12-08 ASSESSMENT — COLUMBIA-SUICIDE SEVERITY RATING SCALE - C-SSRS
1. IN THE PAST MONTH, HAVE YOU WISHED YOU WERE DEAD OR WISHED YOU COULD GO TO SLEEP AND NOT WAKE UP?: NO
6. HAVE YOU EVER DONE ANYTHING, STARTED TO DO ANYTHING, OR PREPARED TO DO ANYTHING TO END YOUR LIFE?: NO
2. HAVE YOU ACTUALLY HAD ANY THOUGHTS OF KILLING YOURSELF?: NO

## 2023-12-08 NOTE — ED TRIAGE NOTES
Pt states 11 weeks OB. Pt c/o vag bleeding that began at 1200 today. Pt states high risk. LMP 2023. .

## 2023-12-08 NOTE — ED PROCEDURE NOTE
Procedure    Performed by: Francoise oPllard MD  Authorized by: Marcellus Rico PA-C        Genitourinary Indications: obstetric vaginal bleeding            Comments: Fetal cardiac activity identified                 Francoise Pollard MD  Resident  12/08/23 4775

## 2023-12-08 NOTE — ED PROVIDER NOTES
HPI   Chief Complaint   Patient presents with    Vaginal Bleeding - Pregnant     11 weeks OB       Limitations to History: None    HPI:-year-old female G5, P1 at approximately 11 weeks gestation from last menstrual period with a previously confirmed intrauterine pregnancy who presents to the emergency room with painless vaginal bleeding that started yesterday.  She states that she started noticing some light bleeding while wiping.  Patient denies any constitutional symptoms such as fevers, chills, cough, nasal congestion.  Patient denies any nausea, vomiting, or changes in bowel habits.  Patient denies any urinary frequency, hesitancy, dysuria, or hematuria.  Patient denies any vaginal itching or discharge.  Patient is agreeable to do a pelvic exam but would not like STD testing at this time.    Additional History Obtained from: None    12 point review of systems was performed and is negative unless otherwise specified    ------------------------------------------------------------------------------------------------------------------------------------------  Physical Exam:    VS: As documented in the triage note and EMR flowsheet from this visit were reviewed.    CONSTITUTIONAL: Well appearing, well nourished, awake, alert, oriented to person, place, time/situation and in no apparent distress.   EYES: Clear bilaterally, pupils equal, round and reactive to light.   CARDIOVASCULAR: Normal rate, regular rhythm.  Heart sounds S1, S2.  No murmurs, rubs or gallops.  RESPIRATORY: Breath sounds clear and equal bilaterally. No wheezes or rhonchi.   GASTROINTESTINAL: Abdomen soft, non-distended, no rebound, no guarding.  No tenderness to palpation in all 4 quadrants.  No suprapubic or CVA tenderness.  Negative Burton's point, Rovsing, psoas, Madison sign.  : A pelvic exam was performed on patient.  A female chaperone in the room was Madelaine Keane.  There is normal external vaginal anatomy with warm mucous membranes of the  inner vaginal walls.  The cervical os is visualized to be closed.  There is a small amount of blood draining from the cervical os without pooling.  On bimanual exam, there is no cervical motion tenderness or adnexal pain.  MUSCULOSKELETAL: Spine appears normal, range of motion is not limited, no muscle or joint tenderness.   NEUROLOGICAL: Alert and oriented, no focal deficits, no motor or sensory deficits.   SKIN: Skin normal color for race, warm, dry and intact. No evidence of trauma.   PSYCHIATRIC: Alert and oriented to person, place, time/situation. normal mood and affect. No apparent risk to self or others.     ------------------------------------------------------------------------------------------------------------------------------------------    Medical Decision Making:    This is a 28-year-old female G5, P1 at 11 weeks gestation who presents to the emergency room with a pregnancy concern.  Patient remained stable throughout course of care.  My abdominal exam is benign and lowers my concern for intra-abdominal processes such as cholecystitis, pancreatitis, appendicitis, or bowel obstruction.  Pelvic exam is reassuring and lowers my concern for TOA, torsion, ectopic pregnancy, or PID.  A point-of-care ultrasound was utilized by the resident who was able to ascertain a normal uterine pregnancy with a fetal heart rate of 171.  There was no evidence of massive hemorrhage or pelvic free fluid.  Wet prep, CMP, type and screen, urinalysis with reflex to culture, CBC with differential, lipase, and a quantitative hCG was ordered. hCG is 105,909.  Lipase is unremarkable.  Wet prep is unremarkable.  CBC was unremarkable.  CMP was unremarkable.  Urinalysis was unremarkable.  Given the reassuring point-of-care ultrasound, there is lower concern for fetal demise and patient should follow-up with her OB/GYN as soon as possible.  It is possible that patient is in the early stage of a miscarriage.  Patient was given strict  return precautions including increased bleeding, increased abdominal pain, development of constitutional symptoms, etc.  Patient has prenatal vitamins, Zofran, and Tylenol at home.  Patient has a follow-up appointment with OB/GYN in 2 weeks.  I strongly encouraged the patient follows up sooner if possible with them.  Patient was agreeable to this plan and had no further questions.  I did provide patient with the number for the [facility] for outpatient follow-up.  Patient will be discharged home.  Patient understands that if symptoms worsen or change in any way, they should return for immediate medical attention.  Patient understands that they should follow-up with their primary care physician within the next week to follow-up for vaginal bleeding in pregnancy.  Patient was stable upon discharge.      External Records Reviewed: I reviewed recent and relevant outside records including: Previous provider notes      Escalation of Care:  Appropriate for outpatient follow-up with primary care provider, OB/GYN    Social Determinants Affecting Care:  None    Prescription Drug Consideration: None      CARLA Dickens, PA-C  [facility]                            No data recorded                Patient History   Past Medical History:   Diagnosis Date    Asthma      Past Surgical History:   Procedure Laterality Date    CHOLECYSTECTOMY      D&C FIRST TRIMESTER / TX INCOMPLETE / MISSED / SEPTIC / INDUCED       D&E SECOND TRIMESTER       Family History   Problem Relation Name Age of Onset    COPD Father      Sleep apnea Father       Social History     Tobacco Use    Smoking status: Never    Smokeless tobacco: Never   Vaping Use    Vaping Use: Never used   Substance Use Topics    Alcohol use: Not Currently    Drug use: Never       Physical Exam   ED Triage Vitals [23 1318]   Temp Heart Rate Resp BP   36.7 °C (98 °F) 88 16 134/76       SpO2 Temp Source Heart Rate Source Patient Position   98 % Oral Monitor --      BP Location FiO2 (%)     -- --       Physical Exam    ED Course & MDM        Medical Decision Making      Procedure  Procedures     Marcellus Rico PA-C  12/08/23 1279

## 2023-12-12 ENCOUNTER — TELEPHONE (OUTPATIENT)
Dept: GENETICS | Facility: CLINIC | Age: 28
End: 2023-12-12
Payer: COMMERCIAL

## 2023-12-12 NOTE — PROGRESS NOTES
Brian Malagon is a 28 y.o.  at 12w0d with a working estimated date of delivery of 2024, by Ultrasound who presents for a routine prenatal visit.     She denies current vaginal bleeding, abdominal pain, leakage of fluid.     Patient was seen in ED on  for spotting, since then she states spotting has decreased. Patient denies any vaginal itching/burning. RH +.     Patient is amenable to flu shot today.     Nausea and vomiting improved. Unisom helps a lot at night. Patient is eating enough. She has food at home.     Objective   Physical Exam  Weight: 108 kg (237 lb), Pregravid BMI: 41.61  Expected Total Weight Gain: 5 kg (11 lb)-9 kg (19 lb)   BP: 111/74       FHTs not heard with doppler, +FM and WNL FHTs seen on BSU     Problem List Items Addressed This Visit       Recurrent pregnancy loss - Primary    Overview     Saw genetics          Obesity in pregnancy    Overview       Pre-preg BMI 41  Discussed recommended weight gain to 11-20 lbs   Recommended initiation of bASA at 12 wga, pt is amenable         Nausea and vomiting in pregnancy prior to 22 weeks gestation    Overview     Rx sent for unisom and B6         Supervision of high risk pregnancy in first trimester    Overview     Desired provider in labor: [] CNM  [] Physician  [x] Dated by: 7w US not c/w LMP  [x] Initial BMI: 41  [] Prenatal Labs: needs Hemoglobin electro   [x] Rh status: A+  [x] Genetic Screening:  rr cfDNA  [] Baby ASA    [] Anatomy US:  [x] Fetal Sex: male   [] Patient added to BirthTracks  [] 1hr GCT at 24-28wks:  [] 3 hr GTT (if indicated):  [] Rhogam (if indicated):   [] Fetal Surveillance (if indicated):    [] Tdap (27-36wks):  [x] Flu Shot: 12/15/23  [] COVID vaccine:     [] Breastfeeding:  [] Pain management during labor:   [] Postpartum Birth control method:     [] GBS at 36 wks:  [] 39 weeks discussion of IOL vs. Expectant management:  [] Mode of delivery:                 -1st tri    -Reviewed  reasons to call CNM on-call: vaginal bleeding, loss of fluid, severe pelvic pain, or any questions/concerns  -RTC in 4 weeks or prn    CHAUNCEY Duong-MAGUE

## 2023-12-12 NOTE — TELEPHONE ENCOUNTER
Patient was notified of her negative cell-free DNA results by phone.  Limitations of the screen were reviewed.  Patient elected to know suspected fetal sex.  Patient verbalized understanding.  We discussed that [employer] ECS is still pending.    Michelle Hughes MS  Licensed Genetic Counselor

## 2023-12-14 NOTE — PROGRESS NOTES
"Eunice Malagon is a 28 y.o. old, W7V5LC2 female who was approximately 10 weeks and 4 days pregnant at the time of our appointment with an EDC of 24.  She was seen with her daughter to discuss her genetic screening and testing options due to recurrent pregnancy loss.      PAST HISTORY:  The patient has a personal history of obesity and asthma.  The patient previously had genetic counseling from Cary Aparicio MS [facility] due to her history of a 21 week IUFD and a 7 week pregnancy loss with Monosomy X.  Per Genetic Consultation note Cary Aparicio MS, [facility] 2018: \"Patient's first pregnancy in  resulted in an intrauterine fetal demise at 21-4/7 weeks' gestation. Fetal anatomy ultrasound at 20-3/7 weeks gestation noted a six-day growth lag, low fluid, and a thick placenta. Karyotype was performed and noted to be normal female (46,XX) which was consistent with ultrasound reported gender; however pathology report indicated a male fetus. THerefore it is unclear whether there may have been discordant gender. Anticardiolipin antibody testing, DRVVT screen, and parvovirus testing was all negative. More recently, the patient had a first trimester pregnancy loss in 2016. Karyotype indicated 45,X...\"    Pregnancy history:  Pregnancy #1- FOB #1, IUFD at 21 weeks, 46,XX, female on karyotype, ultrasound female, autopsy indicated that genitals appeared male  Pregnancy #2- FOB #2, SAB at 7 weeks, POC analysis Monosomy X  Pregnancy #3- FOB#2, healthy 5 year old daughter  Pregnancy #4- FOB#2, SAB at 7 weeks, no genetic testing on the products of conception from this pregnancy loss     The patient has not had any screening or diagnostic testing for aneuploidy in her pregnancy thus far.    FAMILY HISTORY:  Medical and family histories were reviewed and the following concerns regarding this pregnancy were apparent:      Ms. Malagon:  Personal history- as noted above   Mother- asthma, kidney stones  Maternal half-aunt- chronic kidney " disease of unknown etiology, lupus  Maternal grandmother- six pregnancy losses  Father- COPD, sleep apnea,      Her partner, Claudy:  Personal history- ADHD, dyslexia     The remainder of the family history was negative for birth defects, intellectual disability, recurrent pregnancy loss, or recognized inherited conditions.  Consanguinity was denied.  The Pedigree is scanned in the Media tab and is available for a full review of the family history.      ETHNICITY:  The patient reported that she is of  (countries of origin are unknown) ethnicity.  She reported that her partner ins of [place]i ethnicity.  Ashkenazi Jain Ancestry was denied.    We discussed the availability, benefits, and limitations of carrier screening for cystic fibrosis (CF), spinal muscular atrophy (SMA), and hemoglobinopathies/thalassemias. We reviewed the carrier frequencies of these conditions, varied clinical manifestations, and their autosomal recessive inheritance. The patient has already had negative carrier screening for hemoglobinopathies and thalassemias via CBC and hemoglobin electrophoresis and these results were reviewed. If both members of the couple are found to be carriers for the same autosomal recessive condition, there is a 25% chance for an affected child and prenatal diagnosis would be available. Negative carrier screening does not rule out the possibility of being a carrier.  screening is available for CF, hemoglobinopathies, and thalassemias, and SMA.  We also discussed the availability of more expanded/pan ethnic carrier screening for additional, primarily autosomal recessive, conditions. We discussed the pros and cons of expanded carrier screening including the higher likelihood of being identified as a carrier for at least one condition on a larger panel. Approximately 4% of couples are found to be at risk to have a child with a genetic disorder based on this screening. In rare cases, expanded  "carrier screening results may have health implications for the tested individual.      After careful consideration, the patient elected expanded carrier screening to [facility] for 556 genes.      COUNSELING:  The following information was discussed with your patient:    1. Per previous genetic counseling note Cary Aparicio, MS [facility] 1/8/2018 \"1.  At this time, it is unclear why the patient's first pregnancy resulted in intrauterine fetal demise. Second trimester IUFD may be associated with maternal conditions, placental abnormalities, infection, and fetal malformations or genetic disorders. No fetal malformation were identified on ultrasound, and patient was screened for some common maternal conditions associated with fetal demise, parvovirus infection, and also had chromosome analysis on products of conception. A specific underlying cause was not identified from this testing; therefore risk of recurrence for similar concern in the current or in a future pregnancy is unknown...\"      We reviewed that for this pregnancy genetic testing was 46,XX and that ultrasound anatomy appeared female.  The autopsy report indicated the the fetus was phenotypically male.  We discussed that possible reasons that this can occur include the genetic testing had maternal cell contamination, there was a disorder of sexual development, the appearance of the genitals were difficult to discern on autopsy, and other various reasons.      2. The patient had a previous pregnancy with Quiles syndrome (Monosomy X). We discussed that approximately 98-99% of fetuses' with Quiles syndrome are miscarried or stillborn. \"In general, Quiles syndrome is considered to be a sporadic condition. Recurrence in subsequent pregnancies is rare, but has occurred. It is assumed that the likelihood of recurrence is similar to that in the general population (in other words, no increased risk for couples who have had a previous affected pregnancy). To our knowledge, " "there also is no increased risk for other types of genetic abnormalities.\" <https://rarediseases.info.nih.gov/diseases/7831/barnes-syndrome/cases/10401>     3. The general population risk of 3-5% for birth defects in every pregnancy.    4. Chromosomes, genes, non-disjunction, and common aneuploidies.  Based on the age of 29 at EDC alone, at this gestation, the risk for the fetus to have Down syndrome is approximately 1 in 770.  The combined risk for the fetus to have Trisomy 21/18/13 is approximately 1 in 515.  This does not include copy number variation, mosaicism, single gene disorders, translocations, and marker chromosomes.     5. The availability of maternal serum screening during the first trimester.    6. The availability, benefits and limitations of ultrasound study. An ultrasound study is recommended at 12-14 weeks gestation for assessment of nuchal translucency and again at 19-20 weeks gestation to survey fetal organs. An ultrasound study in the second trimester can identify 50% of Down syndrome cases and 80-90% of trisomy 18 or trisomy 13 cases.     7. The availability, benefits and limitations of standard cell-free DNA screening.  We discussed the methodology for this screen, which includes using cell-free DNA obtained from a mother's blood (derived from the placenta) to screen for the presence of common chromosomal abnormalities. Depending on the laboratory used, there is a >99% sensitivity for Down syndrome, at least 97.4% sensitivity for trisomy 18, and at least 91% sensitivity for trisomy 13.  Specificity for these trisomies is >99%. Although sensitivity and specificity rates are high, in our experience the positive predictive value is dependent on many factors; whereas false negative results are rare.  In addition, anticoagulants, maternal chromosome abnormality, fibroids or malignancy may impact results and/or be associated with inconclusive or other abnormal findings.  This is not a diagnostic " test.  Therefore, in the event of an abnormal result, prenatal diagnosis through amniocentesis is recommended to confirm the findings, if confirmation is desired.  Results take approximately 7-10 days.      8. The methods, benefits, limitations and risks of CVS.  There is an approximate 1 in 200 risk of complications including a 1 in 400 risk for miscarriage. The approximate 1% risk of confined placental mosaicism in CVS was discussed.     9. The methods, benefits, limitations and risks of amniocentesis.  There is an approximate 1 in 400 risk of complications including a 1 in 800 risk of miscarriage.    10. Additional Family History Information:  The patient has a personal history of obesity and asthma.  The patient has a family history of COPD, sleep apnea, lupus, asthma, and kidney stones. The patient's partner has a personal history of ADHD and dyslexia. Often, these conditions are due to a combination of genetic and environmental factors (which often remain unknown).  The risk to have them often depends on the amount and degree of affected family members.  It also depends on if an underlying genetic cause of these conditions can be identified.   With this history, the couple and their offspring are at an increased risk for the disorders in their respective families and this information should be shared with all relevant primary care providers.      The patient's maternal grandmother has a personal history of recurrent pregnancy loss.  There are many different causes of miscarriage.  In 5% of couples that have experienced two or more unexplained miscarriages, a chromosomal rearrangement can be found in one of the partners.  There are various other causes of miscarriage including (but not limited to) maternal infection, hormonal imbalance, structural uterine abnormalities, and spontaneous chromosomal abnormalities.  A genetics evaluation is recommended for any individual who has experienced two or more unexplained  losses.  This information is generally used for reproductive purposes.      The patient reported that her maternal half-aunt has chronic kidney disease of unspecified etiology.  We reviewed that we are learning more about the genetics of kidney disorders, and sometimes these conditions run in families. General genetic counseling is available for any family member with kidney failure and an appointment can be made by calling [phone]. If an underlying genetic etiology in the family is determined, precise recurrence risks could be provided, and testing for other family members may be available if clinically indicated.      DISPOSITION:  The patient stated that she understood the above information and elected to proceed with standard cell-free DNA screening to [facility].  The patient also elected expanded carrier screening to [facility] for 556 genes.  Diagnostic testing in the form of CVS and genetic amniocentesis was declined.      We recommend a follow up appointment in approximately 4 weeks to review the results of the patient's expanded carrier screening.  We recommend an NT ultrasound at 12 weeks.  We recommend an anatomy ultrasound at 19 weeks.     Thank you for allowing us to participate in the care of your patient.  Should you or your patient have any questions, please do not hesitate to contact our office at [phone].    Licensed Genetic Counselor Michelle Hughes MS, [facility] spent 43 minutes with the patient with greater than 50% of the time spent in face to face counseling.     Sincerely,    Michelle Hughes MS  Licensed Genetic Counselor    Reviewed by:  Dr. Yadiel Holloway MD  Maternal Fetal Medicine Specialist

## 2023-12-15 ENCOUNTER — ROUTINE PRENATAL (OUTPATIENT)
Dept: OBSTETRICS AND GYNECOLOGY | Facility: HOSPITAL | Age: 28
End: 2023-12-15
Payer: COMMERCIAL

## 2023-12-15 VITALS — DIASTOLIC BLOOD PRESSURE: 74 MMHG | BODY MASS INDEX: 40.68 KG/M2 | WEIGHT: 237 LBS | SYSTOLIC BLOOD PRESSURE: 111 MMHG

## 2023-12-15 DIAGNOSIS — Z3A.12 12 WEEKS GESTATION OF PREGNANCY (HHS-HCC): Primary | ICD-10-CM

## 2023-12-15 DIAGNOSIS — O09.91 SUPERVISION OF HIGH RISK PREGNANCY IN FIRST TRIMESTER (HHS-HCC): ICD-10-CM

## 2023-12-15 DIAGNOSIS — O99.210 OBESITY IN PREGNANCY (HHS-HCC): ICD-10-CM

## 2023-12-15 DIAGNOSIS — N96 RECURRENT PREGNANCY LOSS: ICD-10-CM

## 2023-12-15 DIAGNOSIS — O21.9 NAUSEA AND VOMITING IN PREGNANCY PRIOR TO 22 WEEKS GESTATION (HHS-HCC): ICD-10-CM

## 2023-12-15 PROBLEM — Z87.59 HISTORY OF IUFD: Status: RESOLVED | Noted: 2023-11-10 | Resolved: 2023-12-15

## 2023-12-15 PROCEDURE — 99213 OFFICE O/P EST LOW 20 MIN: CPT | Performed by: ADVANCED PRACTICE MIDWIFE

## 2023-12-15 PROCEDURE — 90686 IIV4 VACC NO PRSV 0.5 ML IM: CPT | Performed by: ADVANCED PRACTICE MIDWIFE

## 2023-12-15 PROCEDURE — 99213 OFFICE O/P EST LOW 20 MIN: CPT | Mod: TH | Performed by: ADVANCED PRACTICE MIDWIFE

## 2023-12-15 RX ORDER — NAPROXEN SODIUM 220 MG/1
81 TABLET, FILM COATED ORAL 2 TIMES DAILY
Qty: 60 TABLET | Refills: 11 | Status: SHIPPED | OUTPATIENT
Start: 2023-12-15 | End: 2024-12-14

## 2023-12-15 NOTE — TELEPHONE ENCOUNTER
----- Message from Myrna Mayers APRN-CNM sent at 12/15/2023 12:26 PM EST -----  Can you please call Eunice and ask her if she'd like to be on ASA 81mg? I forgot to ask her at her visit today. This is a medication that is recommended to help ensure her placenta works as well as possible to decrease her chance of developing high blood for baby being small.  Her risk factors include her weight, and her recurrent pregnancy loss.  If she has more questions about this and happy to talk to her.    Thanks,  Myrna     Called patient to discuss taking aspirin in pregnancy to reduce risk of preeclampsia  Patient amendable to taking aspirin  Pharmacy verified  Medication pended to provider for signature  Encouraged patient to call office with any questions or concerns  Jesenia Hamm RN

## 2023-12-20 ENCOUNTER — HOSPITAL ENCOUNTER (OUTPATIENT)
Dept: RADIOLOGY | Facility: HOSPITAL | Age: 28
Discharge: HOME | End: 2023-12-20
Payer: COMMERCIAL

## 2023-12-20 DIAGNOSIS — Z36.82 NUCHAL TRANSLUCENCY OF FETUS ON PRENATAL ULTRASOUND (HHS-HCC): ICD-10-CM

## 2023-12-20 PROCEDURE — 76815 OB US LIMITED FETUS(S): CPT | Performed by: OBSTETRICS & GYNECOLOGY

## 2023-12-20 PROCEDURE — 76801 OB US < 14 WKS SINGLE FETUS: CPT

## 2023-12-22 ENCOUNTER — APPOINTMENT (OUTPATIENT)
Dept: RADIOLOGY | Facility: HOSPITAL | Age: 28
End: 2023-12-22
Payer: COMMERCIAL

## 2023-12-29 DIAGNOSIS — O21.9 NAUSEA AND VOMITING IN PREGNANCY PRIOR TO 22 WEEKS GESTATION (HHS-HCC): ICD-10-CM

## 2024-01-02 ENCOUNTER — APPOINTMENT (OUTPATIENT)
Dept: GENETICS | Facility: HOSPITAL | Age: 29
End: 2024-01-02
Payer: COMMERCIAL

## 2024-01-13 NOTE — PROGRESS NOTES
Brian Malagon is a 28 y.o.  at 16w5d with a working estimated date of delivery of 2024, by Ultrasound who presents for a routine prenatal visit.     She denies vaginal bleeding, abdominal pain, leakage of fluid. Nausea better. That makes her happy, but also nervous. She has enough food at home, just food aversions.     Objective   Physical Exam  Weight: 106 kg (234 lb), Pregravid BMI: 41.61  Expected Total Weight Gain: 5 kg (11 lb)-9 kg (19 lb)   BP: 112/75  Fetal Heart Rate: 140      Problem List Items Addressed This Visit       Obesity in pregnancy    Overview     BMI = 41.61 at NOB  Fetal surveillance BMI >40 at NOB:  Growth US at 30 and 36 wks  BPP or NST weekly 34 wks to delivery    Timing of delivery: 39 0/7 - 39 6/7 wks  *BMI >= 50 at any time in pregnancy: Deliver at Level 4           RESOLVED: Nausea and vomiting in pregnancy prior to 22 weeks gestation    Overview     Rx sent for unisom and B6         Supervision of high risk pregnancy in first trimester    Overview     Desired provider in labor: [] CNM  [] Physician  [x] Dated by: 7w US not c/w LMP  [x] Initial BMI: 41  [] Prenatal Labs: needs Hemoglobin electro   [x] Rh status: A+  [x] Genetic Screening:  rr cfDNA  [x] Baby ASA : rx sent 12/15    [] Anatomy US:  [x] Fetal Sex: male   [] Patient added to BirthTracks  [] 1hr GCT at 24-28wks:  [] 3 hr GTT (if indicated):  [] Rhogam (if indicated):   [] Fetal Surveillance (if indicated):    [] Tdap (27-36wks):  [x] Flu Shot: 12/15/23  [] COVID vaccine:     [] Breastfeeding:  [] Pain management during labor:   [] Postpartum Birth control method:     [] GBS at 36 wks:  [] 39 weeks discussion of IOL vs. Expectant management:  [] Mode of delivery:               Other Visit Diagnoses       16 weeks gestation of pregnancy    -  Primary            -Anatomy US ordered;   -Reviewed reasons to call CNM on-call: vaginal bleeding, loss of fluid, severe pelvic pain, or any  questions/concerns  -RTC in 4 weeks or prn      CHAUNCEY Duong-MAGUE

## 2024-01-17 ENCOUNTER — ROUTINE PRENATAL (OUTPATIENT)
Dept: OBSTETRICS AND GYNECOLOGY | Facility: HOSPITAL | Age: 29
End: 2024-01-17
Payer: COMMERCIAL

## 2024-01-17 VITALS — WEIGHT: 234 LBS | SYSTOLIC BLOOD PRESSURE: 112 MMHG | DIASTOLIC BLOOD PRESSURE: 75 MMHG | BODY MASS INDEX: 40.17 KG/M2

## 2024-01-17 DIAGNOSIS — O09.91 SUPERVISION OF HIGH RISK PREGNANCY IN FIRST TRIMESTER (HHS-HCC): ICD-10-CM

## 2024-01-17 DIAGNOSIS — Z3A.16 16 WEEKS GESTATION OF PREGNANCY (HHS-HCC): Primary | ICD-10-CM

## 2024-01-17 DIAGNOSIS — O99.210 OBESITY IN PREGNANCY (HHS-HCC): ICD-10-CM

## 2024-01-17 DIAGNOSIS — O21.9 NAUSEA AND VOMITING IN PREGNANCY PRIOR TO 22 WEEKS GESTATION (HHS-HCC): ICD-10-CM

## 2024-01-17 PROCEDURE — 99213 OFFICE O/P EST LOW 20 MIN: CPT | Mod: TH | Performed by: ADVANCED PRACTICE MIDWIFE

## 2024-01-17 PROCEDURE — 99213 OFFICE O/P EST LOW 20 MIN: CPT | Performed by: ADVANCED PRACTICE MIDWIFE

## 2024-02-09 ENCOUNTER — HOSPITAL ENCOUNTER (OUTPATIENT)
Dept: RADIOLOGY | Facility: HOSPITAL | Age: 29
Discharge: HOME | End: 2024-02-09
Payer: COMMERCIAL

## 2024-02-09 DIAGNOSIS — O99.210 OBESITY IN PREGNANCY (HHS-HCC): ICD-10-CM

## 2024-02-09 DIAGNOSIS — Z36.89 SCREENING, ANTENATAL, FOR FETAL ANATOMIC SURVEY (HHS-HCC): ICD-10-CM

## 2024-02-09 PROCEDURE — 76811 OB US DETAILED SNGL FETUS: CPT

## 2024-02-10 NOTE — PROGRESS NOTES
Brian Malagon is a 28 y.o.  at 20w5d with a working estimated date of delivery of 2024, by Ultrasound who presents for a routine prenatal visit.     Doing well. No questions/concerns. She denies vaginal bleeding, leakage of fluid, decreased fetal movements, or contractions.    Objective   Physical Exam  Weight: 107 kg (236 lb)  Expected Total Weight Gain: 5 kg (11 lb)-9 kg (19 lb)   Pregravid BMI: 41.61  BP: 117/76  Fetal Heart Rate: 150 Fundal Height (cm): 20 cm    Problem List Items Addressed This Visit       Obesity in pregnancy    Overview     BMI = 41.61 at NOB  Fetal surveillance BMI >40 at NOB:  Growth US at 30 and 36 wks  BPP or NST weekly 34 wks to delivery    Timing of delivery: 39 0/7 - 39 6/7 wks  *BMI >= 50 at any time in pregnancy: Deliver at Level 4           Supervision of high risk pregnancy in first trimester    Overview     Desired provider in labor: [] CNM  [] Physician  [x] Dated by: 7w US not c/w LMP  [x] Initial BMI: 41  [x] Prenatal Labs: WNL  [x] Rh status: A+  [x] Genetic Screening:  rr cfDNA  [x] Baby ASA : rx sent 12/15    [] Anatomy US:  [x] Fetal Sex: male , yes to circ  [] Patient added to BirthTracks  [] 1hr GCT at 24-28wks:  [] 3 hr GTT (if indicated):  [] Fetal Surveillance (if indicated):    [] Tdap (27-36wks):  [x] Flu Shot: 12/15/23    [] Breastfeeding:  [] Pain management during labor:   [] Postpartum Birth control method:     [] GBS at 36 wks:  [] 39 weeks discussion of IOL vs. Expectant management:  [] Mode of delivery:                 Anatomy US scheduled (repeat anatomy) for    -Reviewed reasons to call CNM on-call: decreased fetal movement, vaginal bleeding, loss of fluid, increased pelvic pain/contractions, or any questions/concerns  -RTC in 4 weeks or prn    Camila GALLOS    I was present with the PA student who participated in the documentation of this note. I have personally seen and re-examined the patient and performed the medical  decision-making components (assessment and plan of care). I have reviewed the PA student documentation and verified the findings in the note as written with additions or exceptions as stated in the body of this note.    ISHAN Duong

## 2024-02-14 ENCOUNTER — ROUTINE PRENATAL (OUTPATIENT)
Dept: OBSTETRICS AND GYNECOLOGY | Facility: HOSPITAL | Age: 29
End: 2024-02-14
Payer: COMMERCIAL

## 2024-02-14 VITALS — DIASTOLIC BLOOD PRESSURE: 76 MMHG | BODY MASS INDEX: 40.51 KG/M2 | WEIGHT: 236 LBS | SYSTOLIC BLOOD PRESSURE: 117 MMHG

## 2024-02-14 DIAGNOSIS — O99.210 OBESITY IN PREGNANCY (HHS-HCC): ICD-10-CM

## 2024-02-14 DIAGNOSIS — Z3A.20 20 WEEKS GESTATION OF PREGNANCY (HHS-HCC): Primary | ICD-10-CM

## 2024-02-14 DIAGNOSIS — O09.91 SUPERVISION OF HIGH RISK PREGNANCY IN FIRST TRIMESTER (HHS-HCC): ICD-10-CM

## 2024-02-14 PROCEDURE — 99213 OFFICE O/P EST LOW 20 MIN: CPT | Mod: TH | Performed by: ADVANCED PRACTICE MIDWIFE

## 2024-02-14 PROCEDURE — 99213 OFFICE O/P EST LOW 20 MIN: CPT | Performed by: ADVANCED PRACTICE MIDWIFE

## 2024-02-20 ENCOUNTER — APPOINTMENT (OUTPATIENT)
Dept: CARDIOLOGY | Facility: HOSPITAL | Age: 29
End: 2024-02-20
Payer: COMMERCIAL

## 2024-02-20 ENCOUNTER — HOSPITAL ENCOUNTER (OUTPATIENT)
Facility: HOSPITAL | Age: 29
Setting detail: OBSERVATION
Discharge: HOME | End: 2024-02-21
Attending: OBSTETRICS & GYNECOLOGY | Admitting: STUDENT IN AN ORGANIZED HEALTH CARE EDUCATION/TRAINING PROGRAM
Payer: COMMERCIAL

## 2024-02-20 DIAGNOSIS — R00.2 PALPITATIONS: Primary | ICD-10-CM

## 2024-02-20 LAB
ERYTHROCYTE [DISTWIDTH] IN BLOOD BY AUTOMATED COUNT: 14.2 % (ref 11.5–14.5)
HCT VFR BLD AUTO: 35.6 % (ref 36–46)
HGB BLD-MCNC: 11.8 G/DL (ref 12–16)
MCH RBC QN AUTO: 30.2 PG (ref 26–34)
MCHC RBC AUTO-ENTMCNC: 33.1 G/DL (ref 32–36)
MCV RBC AUTO: 91 FL (ref 80–100)
NRBC BLD-RTO: 0 /100 WBCS (ref 0–0)
PLATELET # BLD AUTO: 286 X10*3/UL (ref 150–450)
RBC # BLD AUTO: 3.91 X10*6/UL (ref 4–5.2)
WBC # BLD AUTO: 12.6 X10*3/UL (ref 4.4–11.3)

## 2024-02-20 PROCEDURE — 84484 ASSAY OF TROPONIN QUANT: CPT

## 2024-02-20 PROCEDURE — 85027 COMPLETE CBC AUTOMATED: CPT

## 2024-02-20 PROCEDURE — 83880 ASSAY OF NATRIURETIC PEPTIDE: CPT

## 2024-02-20 PROCEDURE — 93005 ELECTROCARDIOGRAM TRACING: CPT

## 2024-02-20 PROCEDURE — 80053 COMPREHEN METABOLIC PANEL: CPT

## 2024-02-20 PROCEDURE — 36415 COLL VENOUS BLD VENIPUNCTURE: CPT

## 2024-02-20 PROCEDURE — 84443 ASSAY THYROID STIM HORMONE: CPT

## 2024-02-20 PROCEDURE — 83735 ASSAY OF MAGNESIUM: CPT

## 2024-02-20 PROCEDURE — 99285 EMERGENCY DEPT VISIT HI MDM: CPT | Mod: 25

## 2024-02-20 SDOH — ECONOMIC STABILITY: HOUSING INSECURITY: DO YOU FEEL UNSAFE GOING BACK TO THE PLACE WHERE YOU ARE LIVING?: NO

## 2024-02-20 SDOH — HEALTH STABILITY: MENTAL HEALTH: HAVE YOU USED ANY PRESCRIPTION DRUGS OTHER THAN PRESCRIBED IN THE PAST 12 MONTHS?: NO

## 2024-02-20 SDOH — SOCIAL STABILITY: SOCIAL INSECURITY: PHYSICAL ABUSE: DENIES

## 2024-02-20 SDOH — SOCIAL STABILITY: SOCIAL INSECURITY: HAS ANYONE EVER THREATENED TO HURT YOUR FAMILY OR YOUR PETS?: NO

## 2024-02-20 SDOH — SOCIAL STABILITY: SOCIAL INSECURITY: DOES ANYONE TRY TO KEEP YOU FROM HAVING/CONTACTING OTHER FRIENDS OR DOING THINGS OUTSIDE YOUR HOME?: NO

## 2024-02-20 SDOH — HEALTH STABILITY: MENTAL HEALTH: WISH TO BE DEAD (PAST 1 MONTH): NO

## 2024-02-20 SDOH — HEALTH STABILITY: MENTAL HEALTH: SUICIDAL BEHAVIOR (LIFETIME): NO

## 2024-02-20 SDOH — SOCIAL STABILITY: SOCIAL INSECURITY: VERBAL ABUSE: DENIES

## 2024-02-20 SDOH — SOCIAL STABILITY: SOCIAL INSECURITY: DO YOU FEEL ANYONE HAS EXPLOITED OR TAKEN ADVANTAGE OF YOU FINANCIALLY OR OF YOUR PERSONAL PROPERTY?: NO

## 2024-02-20 SDOH — HEALTH STABILITY: MENTAL HEALTH: HAVE YOU USED ANY SUBSTANCES (CANABIS, COCAINE, HEROIN, HALLUCINOGENS, INHALANTS, ETC.) IN THE PAST 12 MONTHS?: NO

## 2024-02-20 SDOH — SOCIAL STABILITY: SOCIAL INSECURITY: ARE THERE ANY APPARENT SIGNS OF INJURIES/BEHAVIORS THAT COULD BE RELATED TO ABUSE/NEGLECT?: NO

## 2024-02-20 SDOH — SOCIAL STABILITY: SOCIAL INSECURITY: ABUSE SCREEN: ADULT

## 2024-02-20 SDOH — HEALTH STABILITY: MENTAL HEALTH: NON-SPECIFIC ACTIVE SUICIDAL THOUGHTS (PAST 1 MONTH): NO

## 2024-02-20 SDOH — HEALTH STABILITY: MENTAL HEALTH: WERE YOU ABLE TO COMPLETE ALL THE BEHAVIORAL HEALTH SCREENINGS?: YES

## 2024-02-20 SDOH — SOCIAL STABILITY: SOCIAL INSECURITY: ARE YOU OR HAVE YOU BEEN THREATENED OR ABUSED PHYSICALLY, EMOTIONALLY, OR SEXUALLY BY ANYONE?: NO

## 2024-02-20 SDOH — SOCIAL STABILITY: SOCIAL INSECURITY: HAVE YOU HAD THOUGHTS OF HARMING ANYONE ELSE?: NO

## 2024-02-20 ASSESSMENT — LIFESTYLE VARIABLES
HOW MANY STANDARD DRINKS CONTAINING ALCOHOL DO YOU HAVE ON A TYPICAL DAY: PATIENT DOES NOT DRINK
SKIP TO QUESTIONS 9-10: 1
HOW OFTEN DO YOU HAVE A DRINK CONTAINING ALCOHOL: NEVER
AUDIT-C TOTAL SCORE: 0
HOW OFTEN DO YOU HAVE 6 OR MORE DRINKS ON ONE OCCASION: NEVER
AUDIT-C TOTAL SCORE: 0

## 2024-02-20 ASSESSMENT — PAIN SCALES - GENERAL: PAINLEVEL: 0 - NO PAIN

## 2024-02-20 ASSESSMENT — PATIENT HEALTH QUESTIONNAIRE - PHQ9
1. LITTLE INTEREST OR PLEASURE IN DOING THINGS: NOT AT ALL
2. FEELING DOWN, DEPRESSED OR HOPELESS: NOT AT ALL
SUM OF ALL RESPONSES TO PHQ9 QUESTIONS 1 & 2: 0

## 2024-02-21 VITALS
DIASTOLIC BLOOD PRESSURE: 55 MMHG | TEMPERATURE: 97.7 F | WEIGHT: 234.35 LBS | HEART RATE: 95 BPM | SYSTOLIC BLOOD PRESSURE: 107 MMHG | RESPIRATION RATE: 19 BRPM | HEIGHT: 64 IN | BODY MASS INDEX: 40.01 KG/M2 | OXYGEN SATURATION: 97 %

## 2024-02-21 PROBLEM — R00.2 PALPITATIONS: Status: ACTIVE | Noted: 2024-02-21

## 2024-02-21 PROBLEM — R00.2 PALPITATIONS: Status: RESOLVED | Noted: 2024-02-21 | Resolved: 2024-02-21

## 2024-02-21 LAB
ABO GROUP (TYPE) IN BLOOD: NORMAL
ALBUMIN SERPL BCP-MCNC: 3.7 G/DL (ref 3.4–5)
ALP SERPL-CCNC: 54 U/L (ref 33–110)
ALT SERPL W P-5'-P-CCNC: 12 U/L (ref 7–45)
ANION GAP SERPL CALC-SCNC: 16 MMOL/L (ref 10–20)
ANTIBODY SCREEN: NORMAL
AST SERPL W P-5'-P-CCNC: 13 U/L (ref 9–39)
ATRIAL RATE: 75 BPM
BILIRUB SERPL-MCNC: 0.4 MG/DL (ref 0–1.2)
BILIRUBIN, POC: ABNORMAL
BLOOD URINE, POC: NEGATIVE
BNP SERPL-MCNC: 28 PG/ML (ref 0–99)
BUN SERPL-MCNC: 4 MG/DL (ref 6–23)
CALCIUM SERPL-MCNC: 9.1 MG/DL (ref 8.6–10.6)
CARDIAC TROPONIN I PNL SERPL HS: <3 NG/L (ref 0–34)
CARDIAC TROPONIN I PNL SERPL HS: <3 NG/L (ref 0–34)
CHLORIDE SERPL-SCNC: 104 MMOL/L (ref 98–107)
CLARITY, POC: CLEAR
CO2 SERPL-SCNC: 21 MMOL/L (ref 21–32)
COLOR, POC: YELLOW
CREAT SERPL-MCNC: 0.51 MG/DL (ref 0.5–1.05)
EGFRCR SERPLBLD CKD-EPI 2021: >90 ML/MIN/1.73M*2
GLUCOSE SERPL-MCNC: 82 MG/DL (ref 74–99)
GLUCOSE URINE, POC: NEGATIVE
KETONES, POC: POSITIVE
LEUKOCYTE EST, POC: NEGATIVE
MAGNESIUM SERPL-MCNC: 1.62 MG/DL (ref 1.6–2.4)
NITRITE, POC: NEGATIVE
P AXIS: 30 DEGREES
P OFFSET: 201 MS
P ONSET: 158 MS
PH, POC: 6.5
POC APPEARANCE OF BODY FLUID: CLEAR
POTASSIUM SERPL-SCNC: 3.2 MMOL/L (ref 3.5–5.3)
PR INTERVAL: 110 MS
PROT SERPL-MCNC: 6.4 G/DL (ref 6.4–8.2)
Q ONSET: 213 MS
QRS COUNT: 12 BEATS
QRS DURATION: 76 MS
QT INTERVAL: 420 MS
QTC CALCULATION(BAZETT): 469 MS
QTC FREDERICIA: 452 MS
R AXIS: -11 DEGREES
RH FACTOR (ANTIGEN D): NORMAL
SODIUM SERPL-SCNC: 138 MMOL/L (ref 136–145)
SPECIFIC GRAVITY, POC: 1.03
T AXIS: 56 DEGREES
T OFFSET: 423 MS
TSH SERPL-ACNC: 2.6 MIU/L (ref 0.44–3.98)
URINE PROTEIN, POC: ABNORMAL
UROBILINOGEN, POC: 1
VENTRICULAR RATE: 75 BPM

## 2024-02-21 PROCEDURE — G0378 HOSPITAL OBSERVATION PER HR: HCPCS

## 2024-02-21 PROCEDURE — 2500000002 HC RX 250 W HCPCS SELF ADMINISTERED DRUGS (ALT 637 FOR MEDICARE OP, ALT 636 FOR OP/ED)

## 2024-02-21 PROCEDURE — 81002 URINALYSIS NONAUTO W/O SCOPE: CPT

## 2024-02-21 PROCEDURE — 36415 COLL VENOUS BLD VENIPUNCTURE: CPT

## 2024-02-21 PROCEDURE — 99214 OFFICE O/P EST MOD 30 MIN: CPT

## 2024-02-21 PROCEDURE — 86901 BLOOD TYPING SEROLOGIC RH(D): CPT

## 2024-02-21 PROCEDURE — 2500000001 HC RX 250 WO HCPCS SELF ADMINISTERED DRUGS (ALT 637 FOR MEDICARE OP): Mod: SE

## 2024-02-21 PROCEDURE — 99203 OFFICE O/P NEW LOW 30 MIN: CPT | Performed by: STUDENT IN AN ORGANIZED HEALTH CARE EDUCATION/TRAINING PROGRAM

## 2024-02-21 PROCEDURE — 2500000004 HC RX 250 GENERAL PHARMACY W/ HCPCS (ALT 636 FOR OP/ED)

## 2024-02-21 RX ORDER — ONDANSETRON 4 MG/1
4 TABLET, FILM COATED ORAL EVERY 6 HOURS PRN
Status: DISCONTINUED | OUTPATIENT
Start: 2024-02-21 | End: 2024-02-21 | Stop reason: HOSPADM

## 2024-02-21 RX ORDER — SIMETHICONE 80 MG
80 TABLET,CHEWABLE ORAL 4 TIMES DAILY PRN
Status: DISCONTINUED | OUTPATIENT
Start: 2024-02-21 | End: 2024-02-21 | Stop reason: HOSPADM

## 2024-02-21 RX ORDER — HYDRALAZINE HYDROCHLORIDE 20 MG/ML
5 INJECTION INTRAMUSCULAR; INTRAVENOUS ONCE AS NEEDED
Status: DISCONTINUED | OUTPATIENT
Start: 2024-02-21 | End: 2024-02-21 | Stop reason: HOSPADM

## 2024-02-21 RX ORDER — MAGNESIUM CHLORIDE 64 MG
64 TABLET, DELAYED RELEASE (ENTERIC COATED) ORAL ONCE
Status: DISCONTINUED | OUTPATIENT
Start: 2024-02-21 | End: 2024-02-21

## 2024-02-21 RX ORDER — ALBUTEROL SULFATE 90 UG/1
2 AEROSOL, METERED RESPIRATORY (INHALATION) EVERY 6 HOURS PRN
Status: CANCELLED | OUTPATIENT
Start: 2024-02-21

## 2024-02-21 RX ORDER — NIFEDIPINE 10 MG/1
10 CAPSULE ORAL ONCE AS NEEDED
Status: DISCONTINUED | OUTPATIENT
Start: 2024-02-21 | End: 2024-02-21 | Stop reason: HOSPADM

## 2024-02-21 RX ORDER — LANOLIN ALCOHOL/MO/W.PET/CERES
400 CREAM (GRAM) TOPICAL ONCE
Status: COMPLETED | OUTPATIENT
Start: 2024-02-21 | End: 2024-02-21

## 2024-02-21 RX ORDER — METOCLOPRAMIDE 10 MG/1
10 TABLET ORAL EVERY 6 HOURS PRN
Status: DISCONTINUED | OUTPATIENT
Start: 2024-02-21 | End: 2024-02-21 | Stop reason: HOSPADM

## 2024-02-21 RX ORDER — LABETALOL HYDROCHLORIDE 5 MG/ML
20 INJECTION, SOLUTION INTRAVENOUS ONCE AS NEEDED
Status: DISCONTINUED | OUTPATIENT
Start: 2024-02-21 | End: 2024-02-21 | Stop reason: HOSPADM

## 2024-02-21 RX ORDER — ONDANSETRON HYDROCHLORIDE 2 MG/ML
4 INJECTION, SOLUTION INTRAVENOUS EVERY 6 HOURS PRN
Status: DISCONTINUED | OUTPATIENT
Start: 2024-02-21 | End: 2024-02-21 | Stop reason: HOSPADM

## 2024-02-21 RX ORDER — LIDOCAINE HYDROCHLORIDE 10 MG/ML
0.5 INJECTION INFILTRATION; PERINEURAL ONCE AS NEEDED
Status: DISCONTINUED | OUTPATIENT
Start: 2024-02-21 | End: 2024-02-21 | Stop reason: HOSPADM

## 2024-02-21 RX ORDER — POLYETHYLENE GLYCOL 3350 17 G/17G
17 POWDER, FOR SOLUTION ORAL 2 TIMES DAILY PRN
Status: DISCONTINUED | OUTPATIENT
Start: 2024-02-21 | End: 2024-02-21 | Stop reason: HOSPADM

## 2024-02-21 RX ORDER — LORATADINE 10 MG/1
10 TABLET ORAL DAILY
Status: CANCELLED | OUTPATIENT
Start: 2024-02-21

## 2024-02-21 RX ORDER — POTASSIUM CHLORIDE 20 MEQ/1
40 TABLET, EXTENDED RELEASE ORAL ONCE
Status: COMPLETED | OUTPATIENT
Start: 2024-02-21 | End: 2024-02-21

## 2024-02-21 RX ORDER — BISACODYL 10 MG/1
10 SUPPOSITORY RECTAL DAILY PRN
Status: DISCONTINUED | OUTPATIENT
Start: 2024-02-21 | End: 2024-02-21 | Stop reason: HOSPADM

## 2024-02-21 RX ORDER — METOCLOPRAMIDE HYDROCHLORIDE 5 MG/ML
10 INJECTION INTRAMUSCULAR; INTRAVENOUS EVERY 6 HOURS PRN
Status: DISCONTINUED | OUTPATIENT
Start: 2024-02-21 | End: 2024-02-21 | Stop reason: HOSPADM

## 2024-02-21 RX ORDER — NAPROXEN SODIUM 220 MG/1
81 TABLET, FILM COATED ORAL 2 TIMES DAILY
Status: CANCELLED | OUTPATIENT
Start: 2024-02-21

## 2024-02-21 RX ORDER — ACETAMINOPHEN 325 MG/1
975 TABLET ORAL EVERY 6 HOURS PRN
Status: DISCONTINUED | OUTPATIENT
Start: 2024-02-21 | End: 2024-02-21 | Stop reason: HOSPADM

## 2024-02-21 RX ORDER — ADHESIVE BANDAGE
30 BANDAGE TOPICAL
Status: DISCONTINUED | OUTPATIENT
Start: 2024-02-21 | End: 2024-02-21 | Stop reason: HOSPADM

## 2024-02-21 RX ADMIN — Medication 400 MG: at 01:24

## 2024-02-21 RX ADMIN — ACETAMINOPHEN 975 MG: 325 TABLET ORAL at 12:40

## 2024-02-21 RX ADMIN — PRENATAL VIT W/ FE FUMARATE-FA TAB 27-0.8 MG 1 TABLET: 27-0.8 TAB at 08:00

## 2024-02-21 RX ADMIN — POTASSIUM CHLORIDE 40 MEQ: 1500 TABLET, EXTENDED RELEASE ORAL at 01:24

## 2024-02-21 ASSESSMENT — COGNITIVE AND FUNCTIONAL STATUS - GENERAL
DAILY ACTIVITIY SCORE: 24
MOBILITY SCORE: 24

## 2024-02-21 ASSESSMENT — PAIN SCALES - GENERAL
PAINLEVEL_OUTOF10: 6
PAINLEVEL_OUTOF10: 2
PAINLEVEL_OUTOF10: 0 - NO PAIN

## 2024-02-21 ASSESSMENT — PAIN DESCRIPTION - DESCRIPTORS: DESCRIPTORS: ACHING

## 2024-02-21 ASSESSMENT — PAIN - FUNCTIONAL ASSESSMENT
PAIN_FUNCTIONAL_ASSESSMENT: 0-10

## 2024-02-21 ASSESSMENT — PAIN DESCRIPTION - LOCATION: LOCATION: HEAD

## 2024-02-21 NOTE — ED TRIAGE NOTES
PT to the ED with c/o palpitations and chest pain. Pain intermitted located left chest into left shoulder.  due date. . High risk due to miscarriages.

## 2024-02-21 NOTE — ED NOTES
Pharmacy Medication History Review    Eunice Malagon is a 29 y.o. female admitted for Palpitations. Pharmacy reviewed the patient's gowaq-[id number] medications and allergies for accuracy.    The list below reflects the updated PTA list. Comments regarding how patient may be taking medications differently can be found in the Admit Orders Activity    Prior to Admission Medications   Prescriptions Last Dose Informant Patient Reported?   Dulera 200-5 mcg/actuation inhaler 2/19/2024 Self Yes   Sig: Inhale 2 puffs twice a day.   EPINEPHrine 0.3 mg/0.3 mL injection syringe Unknown Self Yes   Sig: Inject 0.3 mL (0.3 mg) into the muscle if needed.   albuterol 2.5 mg /3 mL (0.083 %) nebulizer solution Unknown Self Yes   Sig: Use 3 mL via nebulizer every 4 hours as needed for wheezing/shortness of breath.   albuterol 90 mcg/actuation inhaler Past Week Self Yes   Sig: Inhale 2 puffs every 6 hours if needed for wheezing or shortness of breath.   aspirin 81 mg chewable tablet Unknown Self No   Sig: Chew 1 tablet (81 mg) 2 times a day.   cetirizine (ZyrTEC) 10 mg tablet 2/19/2024 Self Yes   Sig: Take 1 tablet (10 mg) by mouth once daily.   doxylamine (Unisom, doxylamine,) 25 mg tablet   No   Sig: Take 1 tablet (25 mg) by mouth as needed at bedtime for sleep or nausea.   prenatal vitamin, iron-folic, 27 mg iron-800 mcg folic acid tablet 2/19/2024 Self No   Sig: Take 1 tablet by mouth once daily.      Facility-Administered Medications: None        The list below reflects the updated allergy list. Please review each documented allergy for additional clarification and justification.  Allergies  Reviewed by Maryjo Prieto RN on 2/21/2024        Severity Reactions Comments    Shellfish Derived High Shortness of breath     Bee Venom Protein (honey Bee) Not Specified Unknown     Fish Derived Not Specified Unknown             Patient accepts M2B at discharge. Pharmacy has been updated to [facility] pharmacy.    Sources used to complete the  med history include medication dispense reports, care everywhere, office visit AVS, and patient confirmation.    Patient was a pleasant and great historian of home medications during today's medication reconciliation interview. No significant changes were made to the patient's profile in consideration to appropriateness and accuracy.     Deleted medications: Patient states she is no longer using  - Duo-neb   - Ondansetron  - Prenatal vitamin (duplicate therapy)    Kimberly Cortes  PGY1 Pharmacy Resident    [facility]  Please reach out via [employer] Secure Chat for questions, or if no response call [phone] or [facility] “MedRec”

## 2024-02-21 NOTE — H&P
Obstetrical Admission History and Physical - TRIAGE     Eunice Malagon is a 28 yo.  at 21wk4d presenting for palpitations and chest pain. She states she has felt her heart racing intermittently for approximately one week. She states initially these episodes would last a few minutes and occur 1-2 per day. However, they have become more frequent and longer in duration, now lasting 20-30 minutes and occurring up to 5-6x per day. She has a pulse oximeter at home and notes her heart rate rapidly fluctuates from low 100's to high 40's/low 50's. During these episodes she feels nauseous, light headed and dizzy. She notes eating is a trigger and can often precipitate these events. Today she has also had two episodes of sharp chest pain and left shoulder pain during the palpitations, which is what prompted her to come in today. Pt denies LOF, vaginal bleeding, shortness of breath, headache, vision changes, and endorses good fetal movement.     A/P  Eunice Malagon is a 28 yo.  at 21wk4d presenting for palpitations and chest pain w/ noted PAC's on EKG. Denies LOF, vaginal bleeding, shortness of breath, headache, vision changes, and endorses good fetal movement.     Palpitations  Chest Pain  - Vitals stable   - Normal rate and rhythm on exam  - EKG in ED w/ PAC's  - Labs ordered: CBC Hgb 11.8 (from 14 in November & December), CMP notable for K+ 3.2, Mag 1.62 TSH wnl, BNP wnl, Trops wnl  - K & Mag replenished  - Cardio Consulted, recs admission to telemetry floor for continued monitoring    IUP  - k4d  - estimated date of delivery of 2024, by Ultrasound    - denies LOF, VB, endorses good FM  - receives care with Talib OLMSTEAD  - Anatomy US scheduled (repeat anatomy) for      Dispo  - Admission to telemetry floor for further observation      Patient seen and discussed with attending physician Dr. Matthias Sams MD  Family Medicine - PGY 1       Obstetrical History   OB History    Para  Term  AB Living   5 1 1   3 1   SAB IAB Ectopic Multiple Live Births   3       1      # Outcome Date GA Lbr Ham/2nd Weight Sex Delivery Anes PTL Lv   5 Current            4 SAB 10/2022           3 Term 18 39w0d  3.289 kg F Vag-Spont EPI  DAVID   2 SAB  11w0d             Complications: Quiles syndrome   1 2012 21w0d       ND       Past Medical History  Past Medical History:   Diagnosis Date    Asthma         Past Surgical History   Past Surgical History:   Procedure Laterality Date    CHOLECYSTECTOMY      D&C FIRST TRIMESTER / TX INCOMPLETE / MISSED / SEPTIC / INDUCED       D&E SECOND TRIMESTER         Social History  Social History     Tobacco Use    Smoking status: Never    Smokeless tobacco: Never   Substance Use Topics    Alcohol use: Not Currently     Substance and Sexual Activity   Drug Use Never       Allergies  Shellfish derived, Bee venom protein (honey bee), and Fish derived     Medications  Medications Prior to Admission   Medication Sig Dispense Refill Last Dose    albuterol 2.5 mg /3 mL (0.083 %) nebulizer solution Use 3 mL via nebulizer every 4 hours as needed for wheezing/shortness of breath.   Unknown    albuterol 90 mcg/actuation inhaler Inhale 2 puffs every 6 hours if needed for wheezing or shortness of breath.   Past Week    aspirin 81 mg chewable tablet Chew 1 tablet (81 mg) 2 times a day. 60 tablet 11 Unknown    cetirizine (ZyrTEC) 10 mg tablet Take 1 tablet (10 mg) by mouth once daily.   2024    doxylamine (Unisom, doxylamine,) 25 mg tablet Take 1 tablet (25 mg) by mouth as needed at bedtime for sleep or nausea. 30 tablet 1     Dulera 200-5 mcg/actuation inhaler Inhale 2 puffs twice a day.   2024    EPINEPHrine 0.3 mg/0.3 mL injection syringe Inject 0.3 mL (0.3 mg) into the muscle if needed.       ipratropium-albuteroL (Duo-Neb) 0.5-2.5 mg/3 mL nebulizer solution Take 3 mL by nebulization 4 times a day as needed for wheezing. 20 mL 0 Unknown    ondansetron  ODT (Zofran-ODT) 4 mg disintegrating tablet Take 1 tablet (4 mg) by mouth every 8 hours if needed for nausea or vomiting. 1-2 tablets every 8 hours as needed for nausea and/or vomiting 30 tablet 0 Unknown    prenatal vitamin (Classic Prenatal) 28 mg iron-800 mcg folic acid tablet tablet Take 1 tablet by mouth once daily.       prenatal vitamin, iron-folic, 27 mg iron-800 mcg folic acid tablet Take 1 tablet by mouth once daily. 30 tablet 11 2/19/2024       Objective    Last Vitals  Temp Pulse Resp BP MAP O2 Sat   36.1 °C (96.9 °F) 94 18 138/58   97 %     Physical Examination  Constitutional: No visible distress, alert and cooperative  Respiratory/Thorax: Normal respiratory effort on RA  Cardiovascular: Reg rate  Gastrointestinal: soft, nondistended, nontender, gravid  Neurological: A&Ox3  Psychological: Appropriate mood and behavior     Lab Review  Lab Results   Component Value Date    WBC 12.6 (H) 02/20/2024    HGB 11.8 (L) 02/20/2024    HCT 35.6 (L) 02/20/2024     02/20/2024     Lab Results   Component Value Date    GLUCOSE 82 02/20/2024     02/20/2024    K 3.2 (L) 02/20/2024     02/20/2024    CO2 21 02/20/2024    ANIONGAP 16 02/20/2024    BUN 4 (L) 02/20/2024    CREATININE 0.51 02/20/2024    EGFR >90 02/20/2024    CALCIUM 9.1 02/20/2024    ALBUMIN 3.7 02/20/2024    PROT 6.4 02/20/2024    ALKPHOS 54 02/20/2024    ALT 12 02/20/2024    AST 13 02/20/2024    BILITOT 0.4 02/20/2024     Lab Results   Component Value Date    BNP 28 02/20/2024

## 2024-02-21 NOTE — PROGRESS NOTES
2/21/2024 Care coordination  Eunice Malagon is a 29 y.o. female on day 0 of admission presenting with Palpitations.  Pt lives with significant other and young child.  No skilled needs identified.

## 2024-02-21 NOTE — CARE PLAN
The patient's goals for the shift include remain hemodynamically stable throughout shift.    The clinical goals for the shift include patient will report improvement of palpitations during the shift    Over the shift, the patient did make progress toward the following goals.

## 2024-02-21 NOTE — CONSULTS
"MATERNAL FETAL MEDICINE NOTE   2/21/2024, 12:06 PM     SUBJECTIVE:   Patient admitted to med-telemetry floor overnight per cardiology recommendations for further monitoring and echocardiogram in setting of symptomatic palpitations and PACs on admission EKG.    Reviewed telemetry overnight with bedside nurse, few PACs, no other events since admission.    This morning, patient denies chest pain or palpitations. Denies shortness of breath, dizziness, or light headedness. Denies family history of arrhythmias or cardiac disease, denies history of similar events herself. Patient does consume caffeine regularly, does not smoke or use other nicotine products.    Denies vaginal bleeding, leaking of fluid, pelvic cramping or contractions.     OBJECTIVE:   /67   Pulse 90   Temp 36.7 °C (98.1 °F)   Resp 19   Ht 1.626 m (5' 4\")   Wt 106 kg (234 lb 5.6 oz)   LMP 09/12/2023 (Approximate)   SpO2 96%   BMI 40.23 kg/m²    Temp  Min: 35.9 °C (96.6 °F)  Max: 36.7 °C (98.1 °F)  Pulse  Min: 66  Max: 98  BP  Min: 102/64  Max: 138/58    General:  AAOx3, No acute distress  Cardiovascular: Warm and well perfused  Respiratory: Normal respiratory effort   Abdominal:  Soft, gravid, non-tender  Back: No gross deformities  Extremities: Warm, well perfused, no edema, no calf tenderness   Pelvic: deferred    FHT: + on admission, plan for daily dop checks    Labs:   Component      Latest Ref Rng 2/20/2024   Troponin I, High Sensitivity      0 - 34 ng/L <3    Troponin I, High Sensitivity      0 - 34 ng/L <3        Component      Latest Ref Rng 2/20/2024   GLUCOSE      74 - 99 mg/dL 82    SODIUM      136 - 145 mmol/L 138    POTASSIUM      3.5 - 5.3 mmol/L 3.2 (L)    CHLORIDE      98 - 107 mmol/L 104    Bicarbonate      21 - 32 mmol/L 21    Anion Gap      10 - 20 mmol/L 16    Blood Urea Nitrogen      6 - 23 mg/dL 4 (L)    Creatinine      0.50 - 1.05 mg/dL 0.51    EGFR      >60 mL/min/1.73m*2 >90    Calcium      8.6 - 10.6 mg/dL 9.1  "   Albumin      3.4 - 5.0 g/dL 3.7    Alkaline Phosphatase      33 - 110 U/L 54    Total Protein      6.4 - 8.2 g/dL 6.4    AST      9 - 39 U/L 13    Bilirubin Total      0.0 - 1.2 mg/dL 0.4    ALT      7 - 45 U/L 12    WBC      4.4 - 11.3 x10*3/uL 12.6 (H)    nRBC      0.0 - 0.0 /100 WBCs 0.0    RBC      4.00 - 5.20 x10*6/uL 3.91 (L)    HEMOGLOBIN      12.0 - 16.0 g/dL 11.8 (L)    HEMATOCRIT      36.0 - 46.0 % 35.6 (L)    MCV      80 - 100 fL 91    MCH      26.0 - 34.0 pg 30.2    MCHC      32.0 - 36.0 g/dL 33.1    RED CELL DISTRIBUTION WIDTH      11.5 - 14.5 % 14.2    Platelets      150 - 450 x10*3/uL 286    Thyroid Stimulating Hormone      0.44 - 3.98 mIU/L 2.60       Legend:  (L) Low  (H) High    ASSESSMENT AND PLAN:     29 y.o.  at 21w5d admitted to telemetry floor for further monitoring after symptomatic palpitations, PACs noted on admission EKG.     Palpitations, PACs  - Vitals stable, not symptomatic as of this morning  - PACs on admission EKG and overnight on telemetry, no other events since admission  - As above, troponin and BNP negative  - Cardiology consulted and following, appreciate ongoing input  - Plan for echocardiogram today for structural evaluation  - Potassium and magnesium repleted overnight, repeat CMP tomorrow if remains inpatient   - Discussed maternal PACs in pregnancy and benign course, will complete evaluation for more concerning conditions, but discussed that PACs are not generally treated unless they are extremely frequent of bothersome, discussed possible association with caffeine consumption and hydration status, all questions answered  - Will discuss with cardiology disposition after completion of echocardiogram     IUP, 21w5d  - No obstetric complaints at this time  - Daily fetal heart tones while inpatient  - Receives care with Talib OLMSTEAD  - Next fetal ultrasound scheduled  for completion of anatomic survey, next OB appointment 3/13    Dispo: Continue  telemetry monitoring, completion of echocardiogram pending today, may be appropriate for discharge pending no events until that time, normal echocardiogram, and cardiology clearance    Pt seen and discussed with [facility] Attending, Dr. Sulaiman Freeman MD   [facility]  Pager [phone]     Principal Problem:    Palpitations

## 2024-02-21 NOTE — NURSING NOTE
Pt transferred to [place] [address] from [facility] 2 overnight per Cardiology request.  Pt states has been having feelings of palpitations.  Pt showing Sinus Tachycardia on telemetry when first arrived to floor, then Sinus Rhythm once relaxed and went to sleep.  Per alarm review on telemetry, Pt had one episode of bradycardia noted overnight.  Pt has denied any cardiac symptoms since arrival to floor.  Pt to go for Echocardiogram today.  Pt oriented to room and surroundings, and remains safe with stable vitals.

## 2024-02-21 NOTE — CONSULTS
"Inpatient consult to Cardiology  Consult performed by: Allison Daniels MD  Consult ordered by: Yolie COTTON MD        History Of Present Illness:    Eunice Malagon is a 29 y.o. female who is  at 21wk4d and presented for palpitations.     She reports that for the past week, she has been having intermittent short lived (<1 min) episodes of \"fluttering in my chest\". She checks her HR during these episodes using pulse ox and the fastest she saw was 106. No clear triggers but sometimes occurs when eating. She denies any chest pain, SOB, dizziness, or syncope.     She has not cardiac hx and denies any exertional symptoms. She drinks many caffeinated beverages per day.       Social hx   Smoking: Denies  Alcohol: Denies  Illicit drugs: Denies    Family hx:        Father: COPD  pGF: prostate cancer     Last Recorded Vitals:  Vitals:    24 0229 24 0603 24 0723 24 1116   BP: 122/75 102/64 107/56 123/67   Pulse: 98 85 83 90   Resp: 19 18 18 19   Temp: 36.7 °C (98.1 °F) 36.6 °C (97.9 °F) 36.6 °C (97.9 °F) 36.7 °C (98.1 °F)   TempSrc:       SpO2: 96% 96% 95% 96%   Weight: 106 kg (234 lb 5.6 oz)      Height: 1.626 m (5' 4\")          Last Labs:  CBC - 2024: 11:31 PM  12.6 11.8 286    35.6      CMP - 2024: 11:31 PM  9.1 6.4 13 --- 0.4   _ 3.7 12 54      PTT - No results in last year.  _   _ _     Troponin I, High Sensitivity   Date/Time Value Ref Range Status   2024 11:31 PM <3 0 - 34 ng/L Final   2024 11:31 PM <3 0 - 34 ng/L Final     BNP   Date/Time Value Ref Range Status   2024 11:31 PM 28 0 - 99 pg/mL Final     Hemoglobin A1C   Date/Time Value Ref Range Status   2023 04:27 PM 5.0 see below % Final   09/15/2022 02:21 PM 5.1 % Final     Comment:          Diagnosis of Diabetes-Adults   Non-Diabetic: < or = 5.6%   Increased risk for developing diabetes: 5.7-6.4%   Diagnostic of diabetes: > or = 6.5%  .       Monitoring of Diabetes                Age (y)     " Therapeutic Goal (%)   Adults:          >18           <7.0   Pediatrics:    13-18           <7.5                   7-12           <8.0                   0- 6            7.5-8.5   American Diabetes Association. Diabetes Care 33(S1), 2010.        Last I/O:  I/O last 3 completed shifts:  In: - (0 mL/kg)   Out: 150 (1.4 mL/kg) [Urine:150 (0 mL/kg/hr)]  Weight: 106.3 kg     Past Cardiology Tests (Last 3 Years):  EKG:  ECG 12 lead 2024    Echo:      Past Medical History:  She has a past medical history of Asthma.    She has no past medical history of Abnormal Pap smear of cervix.    Past Surgical History:  She has a past surgical history that includes Cholecystectomy; D&C first trimester / Tx incomplete / missed / septic / induced ; and D&E second trimester.      Social History:  She reports that she has never smoked. She has never used smokeless tobacco. She reports that she does not currently use alcohol. She reports that she does not use drugs.    Family History:  Family History   Problem Relation Name Age of Onset    COPD Father      Sleep apnea Father          Allergies:  Shellfish derived, Bee venom protein (honey bee), and Fish derived    Inpatient Medications:  Scheduled medications   Medication Dose Route Frequency    prenatal vitamin (iron-folic)  1 tablet oral Daily     PRN medications   Medication    acetaminophen    bisacodyl    hydrALAZINE    labetaloL    lidocaine    magnesium hydroxide    metoclopramide    Or    metoclopramide    NIFEdipine    ondansetron    Or    ondansetron    oxygen    polyethylene glycol    psyllium    simethicone     Continuous Medications   Medication Dose Last Rate     Outpatient Medications:  Current Outpatient Medications   Medication Instructions    albuterol 2.5 mg /3 mL (0.083 %) nebulizer solution Use 3 mL via nebulizer every 4 hours as needed for wheezing/shortness of breath.    albuterol 90 mcg/actuation inhaler 2 puffs, inhalation, Every 6 hours PRN     aspirin 81 mg, oral, 2 times daily    cetirizine (ZyrTEC) 10 mg tablet 1 tablet, oral, Daily    doxylamine (UNISOM (DOXYLAMINE)) 25 mg, oral, Nightly PRN    Dulera 200-5 mcg/actuation inhaler 2 puffs, inhalation, 2 times daily    EPINEPHrine (EPIPEN) 0.3 mg, intramuscular, As needed    prenatal vitamin, iron-folic, 27 mg iron-800 mcg folic acid tablet 1 tablet, oral, Daily     Physical Exam:  Gen: awake and alert, AAOx3, well-looking  HEENT: normocephalic.  CV: RRR. No murmurs, gallops, rubs  Pulm: clear to auscultation bilaterally. No coarse lung sounds  Abd: soft. Normal active bowel sounds  Ext: warm and well-perfused. No LE edema  Psych: appropriate affect  Neuro: grossly intact sensation and motor functions.          Assessment/Plan   Eunice Malagon is a 29 y.o. female who is  at 21wk4d and presented for palpitations and found to have PACs on EKG. Cardiology was consulted for further evaluation.     On review of Tele: pt noted to have occasional PACs and PVCs, otherwise, normal sinus rhythm. Pt reports feeling occasional palpitations and skipped beats.      Recommendations:  - Please replete K to >4 and Mg to >2.   - Keep on Tele while in-patient.   - Obtain formal Echo.   - Pt was encouraged to limit caffeine intake as it can exacerbate ectopy.   - Will be ok to discharge from cardiology standpoint if Echo is unremarkable and electrolytes are repleted as above.         Thank you for this consultation. General cardiology consult pager: [phone] (weekday 7AM-6PM, weekend 7AM-2PM, other times: [phone])     Peripheral IV 24 20 G Left;Dorsal Hand (Active)   Site Assessment Clean;Dry;Intact 24 0804   Dressing Status Clean;Dry 24 0804   Number of days: 0       Code Status:  Full Code    I spent 45 minutes in the professional and overall care of this patient.        Allison Daniels MD

## 2024-02-23 ENCOUNTER — HOSPITAL ENCOUNTER (OUTPATIENT)
Dept: RADIOLOGY | Facility: HOSPITAL | Age: 29
Discharge: HOME | End: 2024-02-23
Payer: COMMERCIAL

## 2024-02-23 DIAGNOSIS — Z36.89 SCREENING, ANTENATAL, FOR FETAL ANATOMIC SURVEY (HHS-HCC): ICD-10-CM

## 2024-02-23 PROCEDURE — 76816 OB US FOLLOW-UP PER FETUS: CPT | Performed by: STUDENT IN AN ORGANIZED HEALTH CARE EDUCATION/TRAINING PROGRAM

## 2024-02-23 PROCEDURE — 76816 OB US FOLLOW-UP PER FETUS: CPT

## 2024-02-28 ENCOUNTER — APPOINTMENT (OUTPATIENT)
Dept: CARDIOLOGY | Facility: HOSPITAL | Age: 29
End: 2024-02-28
Payer: COMMERCIAL

## 2024-02-29 ENCOUNTER — HOSPITAL ENCOUNTER (OUTPATIENT)
Dept: CARDIOLOGY | Facility: HOSPITAL | Age: 29
Discharge: HOME | End: 2024-02-29
Payer: COMMERCIAL

## 2024-02-29 DIAGNOSIS — R00.2 PALPITATIONS: ICD-10-CM

## 2024-02-29 DIAGNOSIS — R00.2 PALPITATIONS: Primary | ICD-10-CM

## 2024-02-29 PROCEDURE — 93306 TTE W/DOPPLER COMPLETE: CPT | Performed by: INTERNAL MEDICINE

## 2024-02-29 PROCEDURE — 93306 TTE W/DOPPLER COMPLETE: CPT

## 2024-03-01 DIAGNOSIS — O21.9 NAUSEA AND VOMITING IN PREGNANCY PRIOR TO 22 WEEKS GESTATION (HHS-HCC): ICD-10-CM

## 2024-03-01 LAB
AORTIC VALVE PEAK VELOCITY: 1.58 M/S
AV PEAK GRADIENT: 10 MMHG
AVA (PEAK VEL): 2.87 CM2
LEFT ATRIUM VOLUME AREA LENGTH INDEX BSA: 17.8 ML/M2
LEFT VENTRICLE INTERNAL DIMENSION DIASTOLE: 4.3 CM (ref 3.5–6)
LEFT VENTRICULAR OUTFLOW TRACT DIAMETER: 2.1 CM
MITRAL VALVE E/A RATIO: 1.28
MITRAL VALVE E/E' RATIO: 5.2
RIGHT VENTRICLE FREE WALL PEAK S': 14.3 CM/S
TRICUSPID ANNULAR PLANE SYSTOLIC EXCURSION: 2 CM

## 2024-03-12 PROBLEM — I49.1 PAC (PREMATURE ATRIAL CONTRACTION): Status: ACTIVE | Noted: 2024-03-12

## 2024-03-12 NOTE — PROGRESS NOTES
Brian Malagon is a 29 y.o.  at 24w5d with a working estimated date of delivery of 2024, by Ultrasound who presents for a routine prenatal visit.     She denies vaginal bleeding, leakage of fluid, decreased fetal movements, or contractions.    In middle of GCT.     Patient had ECHO on ; no structural abnormalities, left ventricular systolic function WNL. Patient denies any PACs/symptoms for at least 2 weeks since stopping unisom and all caffeine.     Objective   Physical Exam  Weight: 107 kg (236 lb)  Expected Total Weight Gain: 5 kg (11 lb)-9 kg (19 lb)   Pregravid BMI: 41.61  BP: 115/75  Fetal Heart Rate: 155 Fundal Height (cm): 25 cm    Postpartum Depression: Medium Risk (3/13/2024)    [place]  Depression Scale     Last EPDS Total Score: 6     Last EPDS Self Harm Result: Never       Problem List Items Addressed This Visit       Obesity in pregnancy    Overview     BMI = 41.61 at NOB  Fetal surveillance BMI >40 at NOB:  Growth US at 30 and 36 wks  BPP or NST weekly 34 wks to delivery    Timing of delivery: 39 0/7 - 39 6/7 wks  *BMI >= 50 at any time in pregnancy: Deliver at Level 4           Supervision of high risk pregnancy in first trimester    Overview     Desired provider in labor: [] CNM  [] Physician  [x] Dated by: 7w US not c/w LMP  [x] Initial BMI: 41  [x] Prenatal Labs: WNL  [x] Rh status: A+  [x] Genetic Screening:  rr cfDNA  [x] Baby ASA : rx sent 12/15    [x] Anatomy US: WNL  [x] Fetal Sex: male , yes to circ  [] Patient added to BirthTracks  [x] 1hr GCT at 24-28wks: 3/13   [] 3 hr GTT (if indicated):    [] Tdap (27-36wks):  [x] Flu Shot: 12/15/23    [x] Breastfeeding: planning on it  [] Pain management during labor:   [] Postpartum Birth control method:     [] GBS at 36 wks:  [] 39 weeks discussion of IOL vs. Expectant management:  [] Mode of delivery:               Other Visit Diagnoses       24 weeks gestation of pregnancy    -  Primary    Relevant Orders     CBC Anemia Panel With Reflex,Pregnancy    Syphilis Screen with Reflex    Glucose, 1 Hour Screen, Pregnancy            Growth US scheduled for 4/19/23  Discussed diabetes screening and routine labs, to be completed today  Benefits of breastfeeding discussed with patient, breast pump ordered  -Reviewed reasons to call CNM on-call: vaginal bleeding, loss of fluid, increased pelvic pain/contractions, or any questions/concerns  -RTC in 4 weeks or prn  *next visit: Sarah Mayers, CHAUNCEY-MAGUE

## 2024-03-13 ENCOUNTER — LAB (OUTPATIENT)
Dept: LAB | Facility: LAB | Age: 29
End: 2024-03-13
Payer: COMMERCIAL

## 2024-03-13 ENCOUNTER — ROUTINE PRENATAL (OUTPATIENT)
Dept: OBSTETRICS AND GYNECOLOGY | Facility: HOSPITAL | Age: 29
End: 2024-03-13
Payer: COMMERCIAL

## 2024-03-13 VITALS — SYSTOLIC BLOOD PRESSURE: 115 MMHG | BODY MASS INDEX: 40.51 KG/M2 | DIASTOLIC BLOOD PRESSURE: 75 MMHG | WEIGHT: 236 LBS

## 2024-03-13 DIAGNOSIS — Z3A.24 24 WEEKS GESTATION OF PREGNANCY (HHS-HCC): Primary | ICD-10-CM

## 2024-03-13 DIAGNOSIS — Z3A.24 24 WEEKS GESTATION OF PREGNANCY (HHS-HCC): ICD-10-CM

## 2024-03-13 DIAGNOSIS — O99.210 OBESITY IN PREGNANCY (HHS-HCC): ICD-10-CM

## 2024-03-13 DIAGNOSIS — O09.91 SUPERVISION OF HIGH RISK PREGNANCY IN FIRST TRIMESTER (HHS-HCC): ICD-10-CM

## 2024-03-13 LAB
ERYTHROCYTE [DISTWIDTH] IN BLOOD BY AUTOMATED COUNT: 14.1 % (ref 11.5–14.5)
GLUCOSE 1H P 50 G GLC PO SERPL-MCNC: 116 MG/DL
HCT VFR BLD AUTO: 34.3 % (ref 36–46)
HGB BLD-MCNC: 11.5 G/DL (ref 12–16)
MCH RBC QN AUTO: 31.1 PG (ref 26–34)
MCHC RBC AUTO-ENTMCNC: 33.5 G/DL (ref 32–36)
MCV RBC AUTO: 93 FL (ref 80–100)
NRBC BLD-RTO: 0 /100 WBCS (ref 0–0)
PLATELET # BLD AUTO: 291 X10*3/UL (ref 150–450)
RBC # BLD AUTO: 3.7 X10*6/UL (ref 4–5.2)
TREPONEMA PALLIDUM IGG+IGM AB [PRESENCE] IN SERUM OR PLASMA BY IMMUNOASSAY: NONREACTIVE
WBC # BLD AUTO: 10.8 X10*3/UL (ref 4.4–11.3)

## 2024-03-13 PROCEDURE — 86780 TREPONEMA PALLIDUM: CPT

## 2024-03-13 PROCEDURE — 99213 OFFICE O/P EST LOW 20 MIN: CPT | Mod: TH | Performed by: ADVANCED PRACTICE MIDWIFE

## 2024-03-13 PROCEDURE — 99213 OFFICE O/P EST LOW 20 MIN: CPT | Performed by: ADVANCED PRACTICE MIDWIFE

## 2024-03-13 PROCEDURE — 82947 ASSAY GLUCOSE BLOOD QUANT: CPT

## 2024-03-13 PROCEDURE — 85027 COMPLETE CBC AUTOMATED: CPT

## 2024-03-13 PROCEDURE — 36415 COLL VENOUS BLD VENIPUNCTURE: CPT

## 2024-03-13 ASSESSMENT — EDINBURGH POSTNATAL DEPRESSION SCALE (EPDS)
I HAVE FELT SAD OR MISERABLE: NOT VERY OFTEN
I HAVE BEEN SO UNHAPPY THAT I HAVE BEEN CRYING: ONLY OCCASIONALLY
I HAVE BEEN ANXIOUS OR WORRIED FOR NO GOOD REASON: HARDLY EVER
I HAVE FELT SCARED OR PANICKY FOR NO GOOD REASON: NO, NOT MUCH
THINGS HAVE BEEN GETTING ON TOP OF ME: NO, MOST OF THE TIME I HAVE COPED QUITE WELL
I HAVE BEEN ABLE TO LAUGH AND SEE THE FUNNY SIDE OF THINGS: AS MUCH AS I ALWAYS COULD
I HAVE LOOKED FORWARD WITH ENJOYMENT TO THINGS: AS MUCH AS I EVER DID
THE THOUGHT OF HARMING MYSELF HAS OCCURRED TO ME: NEVER
I HAVE BEEN SO UNHAPPY THAT I HAVE HAD DIFFICULTY SLEEPING: NOT AT ALL
I HAVE BLAMED MYSELF UNNECESSARILY WHEN THINGS WENT WRONG: NOT VERY OFTEN
TOTAL SCORE: 6

## 2024-03-14 LAB — REFLEX ADDED, ANEMIA PANEL: NORMAL

## 2024-04-08 NOTE — PROGRESS NOTES
Brian Malagon is a 29 y.o.  at 28w5d with a working estimated date of delivery of 2024, by Ultrasound who presents for a routine prenatal visit.     Doing well. No complaints. She denies vaginal bleeding, leakage of fluid, decreased fetal movements, or contractions.    Amenable to Tdap today.     Objective   Physical Exam:   Weight: 108 kg (238 lb)  Expected Total Weight Gain: 5 kg (11 lb)-9 kg (19 lb)   Pregravid BMI: 41.61  BP: 113/79  Fetal Heart Rate: 140 Fundal Height (cm): 28 cm             Problem List Items Addressed This Visit       Obesity in pregnancy    Overview     BMI = 41.61 at NOB  Fetal surveillance BMI >40 at NOB:  Growth US at 30 and 36 wks  BPP or NST weekly 34 wks to delivery    Timing of delivery: 39 0/7 - 39 6/7 wks  *BMI >= 50 at any time in pregnancy: Deliver at Level 4           Supervision of high risk pregnancy in first trimester    Overview     Desired provider in labor: [x] CNM  [] Physician  [x] Dated by: 7w US not c/w LMP  [x] Initial BMI: 41  [x] Prenatal Labs: WNL  [x] Rh status: A+  [x] Genetic Screening:  rr cfDNA  [x] Baby ASA : rx sent 12/15    [x] Anatomy US: WNL  [x] Fetal Sex: male , yes to circ  [] Patient added to BirthTracks  [x] 1hr GCT at 24-28wks: 3/13 WNL     [x] Tdap (27-36wks): 4/10/24  [x] Flu Shot: 12/15/23    [x] Breastfeeding: planning on it  [x] Pain management during labor:  will wait and see, would like to go natural through  [x] Postpartum Birth control method: wants, unsure what type at 28w     [] GBS at 36 wks:  [] 39 weeks discussion of IOL vs. Expectant management:  [] Mode of delivery:               Other Visit Diagnoses       28 weeks gestation of pregnancy    -  Primary            Coping mechanisms and pain management options during labor discussed, unsure  Postpartum contraception options discussed, unsure, wants to think about it   surveillance weekly for BMI at 34w  Growth US    Reviewed s/sx of PTL, warning  signs, fetal movement counts, and when to call provider  Follow up in 2 week for a routine prenatal visit.  *next visit: birth control     ISHAN Duong

## 2024-04-10 ENCOUNTER — ROUTINE PRENATAL (OUTPATIENT)
Dept: OBSTETRICS AND GYNECOLOGY | Facility: HOSPITAL | Age: 29
End: 2024-04-10
Payer: COMMERCIAL

## 2024-04-10 VITALS — SYSTOLIC BLOOD PRESSURE: 113 MMHG | BODY MASS INDEX: 40.85 KG/M2 | DIASTOLIC BLOOD PRESSURE: 79 MMHG | WEIGHT: 238 LBS

## 2024-04-10 DIAGNOSIS — O99.210 OBESITY IN PREGNANCY (HHS-HCC): ICD-10-CM

## 2024-04-10 DIAGNOSIS — O09.91 SUPERVISION OF HIGH RISK PREGNANCY IN FIRST TRIMESTER (HHS-HCC): ICD-10-CM

## 2024-04-10 DIAGNOSIS — Z3A.28 28 WEEKS GESTATION OF PREGNANCY (HHS-HCC): Primary | ICD-10-CM

## 2024-04-10 PROCEDURE — 99213 OFFICE O/P EST LOW 20 MIN: CPT | Mod: TH | Performed by: ADVANCED PRACTICE MIDWIFE

## 2024-04-10 PROCEDURE — 99213 OFFICE O/P EST LOW 20 MIN: CPT | Performed by: ADVANCED PRACTICE MIDWIFE

## 2024-04-10 PROCEDURE — 90715 TDAP VACCINE 7 YRS/> IM: CPT | Performed by: ADVANCED PRACTICE MIDWIFE

## 2024-04-19 ENCOUNTER — HOSPITAL ENCOUNTER (OUTPATIENT)
Dept: RADIOLOGY | Facility: HOSPITAL | Age: 29
Discharge: HOME | End: 2024-04-19
Payer: COMMERCIAL

## 2024-04-19 DIAGNOSIS — O99.210 OBESITY AFFECTING PREGNANCY (HHS-HCC): ICD-10-CM

## 2024-04-19 DIAGNOSIS — Z36.89 SCREENING, ANTENATAL, FOR FETAL ANATOMIC SURVEY (HHS-HCC): ICD-10-CM

## 2024-04-19 PROCEDURE — 76819 FETAL BIOPHYS PROFIL W/O NST: CPT | Performed by: OBSTETRICS & GYNECOLOGY

## 2024-04-19 PROCEDURE — 76816 OB US FOLLOW-UP PER FETUS: CPT | Performed by: OBSTETRICS & GYNECOLOGY

## 2024-04-19 PROCEDURE — 76819 FETAL BIOPHYS PROFIL W/O NST: CPT

## 2024-04-19 PROCEDURE — 76816 OB US FOLLOW-UP PER FETUS: CPT

## 2024-04-24 ENCOUNTER — APPOINTMENT (OUTPATIENT)
Dept: OBSTETRICS AND GYNECOLOGY | Facility: HOSPITAL | Age: 29
End: 2024-04-24
Payer: COMMERCIAL

## 2024-04-25 NOTE — ADDENDUM NOTE
Encounter addended by: Pauline Bhakta on: 4/25/2024 1:46 PM   Actions taken: Document moved, Document edited

## 2024-05-08 ENCOUNTER — ROUTINE PRENATAL (OUTPATIENT)
Dept: OBSTETRICS AND GYNECOLOGY | Facility: HOSPITAL | Age: 29
End: 2024-05-08
Payer: COMMERCIAL

## 2024-05-08 VITALS — BODY MASS INDEX: 40.51 KG/M2 | WEIGHT: 236 LBS | DIASTOLIC BLOOD PRESSURE: 78 MMHG | SYSTOLIC BLOOD PRESSURE: 116 MMHG

## 2024-05-08 DIAGNOSIS — O09.91 SUPERVISION OF HIGH RISK PREGNANCY IN FIRST TRIMESTER (HHS-HCC): ICD-10-CM

## 2024-05-08 DIAGNOSIS — O33.5XX1: ICD-10-CM

## 2024-05-08 DIAGNOSIS — O99.210 OBESITY IN PREGNANCY (HHS-HCC): ICD-10-CM

## 2024-05-08 DIAGNOSIS — Z3A.32 32 WEEKS GESTATION OF PREGNANCY (HHS-HCC): Primary | ICD-10-CM

## 2024-05-08 PROBLEM — O33.5XX0: Status: ACTIVE | Noted: 2024-05-08

## 2024-05-08 PROCEDURE — 99213 OFFICE O/P EST LOW 20 MIN: CPT | Mod: TH | Performed by: ADVANCED PRACTICE MIDWIFE

## 2024-05-08 PROCEDURE — 99213 OFFICE O/P EST LOW 20 MIN: CPT | Performed by: ADVANCED PRACTICE MIDWIFE

## 2024-05-08 NOTE — PROGRESS NOTES
Brian Malagon is a 29 y.o.  at 32w5d with a working estimated date of delivery of 2024, by Ultrasound who presents for a routine prenatal visit.     She denies vaginal bleeding, leakage of fluid, decreased fetal movements, or contractions.    Patient complains of RUQ pain for 3 weeks. Pain is brought on with laying on her side, reclining back, or crossing her legs. Pain resolves with change of position.     Wondering what if anything can be done about baby is he is still breech.     Objective   Physical Exam:   Weight: 107 kg (236 lb)  Expected Total Weight Gain: 5 kg (11 lb)-9 kg (19 lb)   Pregravid BMI: 41.61  BP: 116/78  FHTs: 125  Fundal Height: 36   ? Fetal presentation? Vertex?       Problem List Items Addressed This Visit       Obesity in pregnancy ([facility])    Overview     BMI = 41.61 at NOB  Fetal surveillance BMI >40 at NOB:  Growth US at 30 and 36 wks  BPP or NST weekly 34 wks to delivery    Timing of delivery: 39 0/7 - 39 6/7 wks  *BMI >= 50 at any time in pregnancy: Deliver at Level 4           Supervision of high risk pregnancy in first trimester ([facility])    Overview     Desired provider in labor: [x] CNM  [] Physician  [x] Dated by: 7w US not c/w LMP  [x] Initial BMI: 41  [x] Prenatal Labs: WNL  [x] Rh status: A+  [x] Genetic Screening:  rr cfDNA  [x] Baby ASA : rx sent 12/15    [x] Anatomy US: WNL  [x] Fetal Sex: male , yes to circ  [] Patient added to BirthTracks  [x] 1hr GCT at 24-28wks: 3/13 WNL     [x] Tdap (27-36wks): 4/10/24  [x] Flu Shot: 12/15/23    [x] Breastfeeding: planning on it  [x] Pain management during labor:  will wait and see, would like to go natural through  [x] Postpartum Birth control method: wants, unsure what type at 32w    [] GBS at 36 wks:  [] 39 weeks discussion of IOL vs. Expectant management:  [] Mode of delivery:               -Discussed about breech presentation; if it persists after 37 weeks option for ECV vs schedule pLTCS; discussed laying  on ironing board with feet up a couple times a day to see about getting fetus into cephalic position   -reviewed need for weekly NST starting at 34w; patient to schedule  -Reviewed s/sx of PTL, warning signs, fetal movement counts, and when to call provider  -Follow up in 2 week for a routine prenatal visit  *next visit: birth control, NST, fetal position     Myrna Mayers, ISHAN

## 2024-05-17 ENCOUNTER — PROCEDURE VISIT (OUTPATIENT)
Dept: OBSTETRICS AND GYNECOLOGY | Facility: HOSPITAL | Age: 29
End: 2024-05-17
Payer: COMMERCIAL

## 2024-05-17 VITALS — SYSTOLIC BLOOD PRESSURE: 122 MMHG | DIASTOLIC BLOOD PRESSURE: 78 MMHG | WEIGHT: 238 LBS | BODY MASS INDEX: 40.85 KG/M2

## 2024-05-17 DIAGNOSIS — O99.210 OBESITY IN PREGNANCY (HHS-HCC): ICD-10-CM

## 2024-05-17 DIAGNOSIS — Z36.89 NST (NON-STRESS TEST) REACTIVE (HHS-HCC): Primary | ICD-10-CM

## 2024-05-17 PROCEDURE — 59025 FETAL NON-STRESS TEST: CPT | Performed by: ADVANCED PRACTICE MIDWIFE

## 2024-05-17 NOTE — PROCEDURES
Eunice Malagon, a  at 34w0d with an RADHA of 2024, by Ultrasound, was seen at [facility] for a nonstress test.    Non-Stress Test   Baseline Fetal Heart Rate for Non-Stress Test: 130 BPM  Variability in Waveform for Non-Stress Test: Moderate  Accelerations in Non-Stress Test: Yes, greater than/equal to 15 bpm, lasting at least 15 seconds  Decelerations in Non-Stress Test: None  Contractions in Non-Stress Test: Not present  Interpretation of Non-Stress Test   Interpretation of Non-Stress Test: Reactive     ISHAN Duong

## 2024-05-22 ENCOUNTER — APPOINTMENT (OUTPATIENT)
Dept: OBSTETRICS AND GYNECOLOGY | Facility: HOSPITAL | Age: 29
End: 2024-05-22
Payer: COMMERCIAL

## 2024-05-22 ENCOUNTER — PROCEDURE VISIT (OUTPATIENT)
Dept: OBSTETRICS AND GYNECOLOGY | Facility: HOSPITAL | Age: 29
End: 2024-05-22
Payer: COMMERCIAL

## 2024-05-22 ENCOUNTER — ROUTINE PRENATAL (OUTPATIENT)
Dept: OBSTETRICS AND GYNECOLOGY | Facility: HOSPITAL | Age: 29
End: 2024-05-22
Payer: COMMERCIAL

## 2024-05-22 VITALS — SYSTOLIC BLOOD PRESSURE: 118 MMHG | WEIGHT: 239 LBS | BODY MASS INDEX: 41.02 KG/M2 | DIASTOLIC BLOOD PRESSURE: 76 MMHG

## 2024-05-22 VITALS — WEIGHT: 239 LBS | SYSTOLIC BLOOD PRESSURE: 118 MMHG | BODY MASS INDEX: 41.02 KG/M2 | DIASTOLIC BLOOD PRESSURE: 76 MMHG

## 2024-05-22 DIAGNOSIS — O33.5XX1: ICD-10-CM

## 2024-05-22 DIAGNOSIS — O99.210 OBESITY IN PREGNANCY (HHS-HCC): ICD-10-CM

## 2024-05-22 DIAGNOSIS — O09.91 SUPERVISION OF HIGH RISK PREGNANCY IN FIRST TRIMESTER (HHS-HCC): ICD-10-CM

## 2024-05-22 DIAGNOSIS — Z3A.34 34 WEEKS GESTATION OF PREGNANCY (HHS-HCC): Primary | ICD-10-CM

## 2024-05-22 PROCEDURE — 99212 OFFICE O/P EST SF 10 MIN: CPT

## 2024-05-22 PROCEDURE — 99212 OFFICE O/P EST SF 10 MIN: CPT | Mod: TH

## 2024-05-22 PROCEDURE — 59025 FETAL NON-STRESS TEST: CPT

## 2024-05-22 NOTE — PROGRESS NOTES
Pt identified by name and .  Pt here for NST.  toco's applied to pt.  Reason for NST BMI  GA 34-5  Strip reviewed by melonie  reactive

## 2024-05-22 NOTE — PROGRESS NOTES
Brian Malagon is a 29 y.o.  at 34w5d with a working estimated date of delivery of 2024, by Ultrasound who presents for a routine prenatal visit.     She denies vaginal bleeding, leakage of fluid, decreased fetal movements, or contractions.    Objective   Physical Exam:   Weight: 108 kg (239 lb)  TWG: -1.59 kg (-3 lb 8.1 oz)  BP: 118/76  Fetal Heart Rate: NST Fundal Height (cm): 39 cm Presentation: Breech         Postpartum Depression: Medium Risk (3/13/2024)    Broxton  Depression Scale     Last EPDS Total Score: 6     Last EPDS Self Harm Result: Never        Prenatal Labs  Lab Results   Component Value Date    HGB 11.5 (L) 2024    HCT 34.3 (L) 2024     2024    SYPHT Nonreactive 2024     Lab Results   Component Value Date    GLUC1P 116 2024     Imaging  The most recent ultrasound was performed on 2024 with a study GA of 30.0wks and EFW 43%, AC 50%.     Assessment/Plan   Problem List Items Addressed This Visit    None  Visit Diagnoses       34 weeks gestation of pregnancy ([facility])    -  Primary          Discussed routine GBS screening, to be completed next visit   surveillance weekly for BMI  Remains undecided regarding birth control method today  Discussed with patient that there is a high suspicion that fetus remains breech via [place] today; if fetus remains breech for 36 week ultrasound, patient is leaning toward pLTCS at this time  Patient is scheduled out until  with all  testing and ORI appointments  No other concerns  Reviewed s/sx of PTL, warning signs, fetal movement counts, and when to call provider  Follow up in 2 weeks for a routine prenatal visit.    Irma Morin, CHAUNCEY-MAGUE

## 2024-05-31 ENCOUNTER — HOSPITAL ENCOUNTER (OUTPATIENT)
Dept: RADIOLOGY | Facility: HOSPITAL | Age: 29
Discharge: HOME | End: 2024-05-31
Payer: COMMERCIAL

## 2024-05-31 DIAGNOSIS — O99.210 OBESITY AFFECTING PREGNANCY (HHS-HCC): ICD-10-CM

## 2024-05-31 DIAGNOSIS — Z36.89 SCREENING, ANTENATAL, FOR FETAL ANATOMIC SURVEY (HHS-HCC): ICD-10-CM

## 2024-05-31 PROCEDURE — 76819 FETAL BIOPHYS PROFIL W/O NST: CPT | Performed by: OBSTETRICS & GYNECOLOGY

## 2024-05-31 PROCEDURE — 76816 OB US FOLLOW-UP PER FETUS: CPT

## 2024-05-31 PROCEDURE — 76816 OB US FOLLOW-UP PER FETUS: CPT | Performed by: OBSTETRICS & GYNECOLOGY

## 2024-05-31 PROCEDURE — 76819 FETAL BIOPHYS PROFIL W/O NST: CPT

## 2024-06-04 NOTE — PROGRESS NOTES
Brian Malagon is a 29 y.o.  at 36w5d with a working estimated date of delivery of 2024, by Ultrasound who presents for a routine prenatal visit.     She denies vaginal bleeding, leakage of fluid, decreased fetal movements, or contractions. Tired of being pregnant. Having a good amount of cramping, but no real contractions.     Patient would like 39w IOL.     Objective   Physical Exam:   Weight: 110 kg (242 lb)  Expected Total Weight Gain: 5 kg (11 lb)-9 kg (19 lb)   Pregravid BMI: 41.61  BP: 111/      Presentation: Vertex           Problem List Items Addressed This Visit       Obesity in pregnancy ([facility])    Overview     BMI = 41.61 at NOB  Fetal surveillance BMI >40 at NOB:  Growth US at 30 (43%) and 36 wks  BPP or NST weekly 34 wks to delivery    Timing of delivery: 39 0/7 - 39 6/7 wks  *BMI >= 50 at any time in pregnancy: Deliver at Level 4           Supervision of high risk pregnancy in first trimester ([facility])    Overview     Desired provider in labor: [x] CNM  [] Physician  [x] Dated by: 7w US not c/w LMP  [x] Initial BMI: 41  [x] Prenatal Labs: WNL  [x] Rh status: A+  [x] Genetic Screening:  rr cfDNA  [x] Baby ASA : rx sent 12/15    [x] Anatomy US: WNL  [x] Fetal Sex: male , yes to circ  [] Patient added to BirthTracks  [x] 1hr GCT at 24-28wks: 3/13 WNL     [x] Tdap (27-36wks): 4/10/24  [x] Flu Shot: 12/15/23    [x] Breastfeeding: planning on it  [x] Pain management during labor:  will wait and see, would like to go natural through  [x] Postpartum Birth control method: POPs    [x] GBS at 36 wks:  collected  [x] 39 weeks discussion of IOL vs. Expectant management: IOL              Other Visit Diagnoses       36 weeks gestation of pregnancy ([facility])    -  Primary            Postpartum contraception options discussed, OCPs  Discussed routine GBS screening, to be completed today   surveillance weekly for BMI  Discussed expectant management vs. scheduling term IOL,  desired IOL; RN flagged to schedule  NST today: reactive  Reviewed s/sx of PTL, warning signs, fetal movement counts, and when to call provider  Follow up in 1 week for a routine prenatal visit.  *next visit: SVE? IOL date     ISHAN Duong

## 2024-06-05 ENCOUNTER — ROUTINE PRENATAL (OUTPATIENT)
Dept: OBSTETRICS AND GYNECOLOGY | Facility: HOSPITAL | Age: 29
End: 2024-06-05
Payer: COMMERCIAL

## 2024-06-05 ENCOUNTER — PROCEDURE VISIT (OUTPATIENT)
Dept: OBSTETRICS AND GYNECOLOGY | Facility: HOSPITAL | Age: 29
End: 2024-06-05
Payer: COMMERCIAL

## 2024-06-05 VITALS — WEIGHT: 242 LBS | BODY MASS INDEX: 41.54 KG/M2 | SYSTOLIC BLOOD PRESSURE: 111 MMHG | DIASTOLIC BLOOD PRESSURE: 77 MMHG

## 2024-06-05 DIAGNOSIS — O99.210 OBESITY IN PREGNANCY (HHS-HCC): ICD-10-CM

## 2024-06-05 DIAGNOSIS — Z36.89 NST (NON-STRESS TEST) REACTIVE (HHS-HCC): ICD-10-CM

## 2024-06-05 DIAGNOSIS — O09.91 SUPERVISION OF HIGH RISK PREGNANCY IN FIRST TRIMESTER (HHS-HCC): ICD-10-CM

## 2024-06-05 DIAGNOSIS — Z3A.36 36 WEEKS GESTATION OF PREGNANCY (HHS-HCC): Primary | ICD-10-CM

## 2024-06-05 PROCEDURE — 99213 OFFICE O/P EST LOW 20 MIN: CPT | Mod: TH | Performed by: ADVANCED PRACTICE MIDWIFE

## 2024-06-05 PROCEDURE — 87081 CULTURE SCREEN ONLY: CPT | Performed by: ADVANCED PRACTICE MIDWIFE

## 2024-06-05 PROCEDURE — 99213 OFFICE O/P EST LOW 20 MIN: CPT | Performed by: ADVANCED PRACTICE MIDWIFE

## 2024-06-05 NOTE — PROCEDURES
Eunice Malagon, a  at 36w5d with an RADHA of 2024, by Ultrasound, was seen at [facility] for a nonstress test.    Non-Stress Test   Baseline Fetal Heart Rate for Non-Stress Test: 150 BPM  Variability in Waveform for Non-Stress Test: Moderate  Accelerations in Non-Stress Test: Yes, greater than/equal to 15 bpm, lasting at least 15 seconds  Decelerations in Non-Stress Test: None  Contractions in Non-Stress Test: Not present  Interpretation of Non-Stress Test   Interpretation of Non-Stress Test: Reactive     ISHAN Duong

## 2024-06-05 NOTE — PROGRESS NOTES
Eunice Shahy, a  at 36w5d with an RADHA of 2024, by Ultrasound, was seen at [facility] for a nonstress test for BMI  NST reactive, read by MAGUE Duong RN

## 2024-06-07 ENCOUNTER — TELEPHONE (OUTPATIENT)
Dept: OBSTETRICS AND GYNECOLOGY | Facility: HOSPITAL | Age: 29
End: 2024-06-07
Payer: COMMERCIAL

## 2024-06-07 DIAGNOSIS — Z34.90 ENCOUNTER FOR ELECTIVE INDUCTION OF LABOR (HHS-HCC): Primary | ICD-10-CM

## 2024-06-07 NOTE — TELEPHONE ENCOUNTER
----- Message from CHAUNCEY Duong-MAGUE sent at 6/5/2024  1:46 PM EDT -----  Patient would like 39w IOL.    Thanks,  Myrna     Patient scheduled for term IOL on 6/23/24 at 5pm at 39.2 weeks  Patient aware of date and time to arrive to L&D  Induction letter to be sent to patient via [employer]  Labor induction order pended to provider for signing  Patient denies any questions or concerns at this time  Patient encouraged to still attend prenatal appointments and to call office with any questions or concerns  Jesenia Hamm RN

## 2024-06-09 LAB — GP B STREP GENITAL QL CULT: ABNORMAL

## 2024-06-10 PROBLEM — O98.819 GROUP B STREPTOCOCCAL INFECTION DURING PREGNANCY (HHS-HCC): Status: ACTIVE | Noted: 2024-06-10

## 2024-06-10 PROBLEM — B95.1 GROUP B STREPTOCOCCAL INFECTION DURING PREGNANCY (HHS-HCC): Status: ACTIVE | Noted: 2024-06-10

## 2024-06-12 ENCOUNTER — ROUTINE PRENATAL (OUTPATIENT)
Dept: OBSTETRICS AND GYNECOLOGY | Facility: HOSPITAL | Age: 29
End: 2024-06-12
Payer: COMMERCIAL

## 2024-06-12 ENCOUNTER — PROCEDURE VISIT (OUTPATIENT)
Dept: OBSTETRICS AND GYNECOLOGY | Facility: HOSPITAL | Age: 29
End: 2024-06-12
Payer: COMMERCIAL

## 2024-06-12 ENCOUNTER — DOCUMENTATION (OUTPATIENT)
Dept: OBSTETRICS AND GYNECOLOGY | Facility: HOSPITAL | Age: 29
End: 2024-06-12
Payer: COMMERCIAL

## 2024-06-12 VITALS
SYSTOLIC BLOOD PRESSURE: 112 MMHG | RESPIRATION RATE: 15 BRPM | WEIGHT: 243 LBS | HEART RATE: 98 BPM | BODY MASS INDEX: 41.71 KG/M2 | OXYGEN SATURATION: 98 % | TEMPERATURE: 97.9 F | DIASTOLIC BLOOD PRESSURE: 77 MMHG

## 2024-06-12 VITALS — BODY MASS INDEX: 41.71 KG/M2 | DIASTOLIC BLOOD PRESSURE: 77 MMHG | WEIGHT: 243 LBS | SYSTOLIC BLOOD PRESSURE: 112 MMHG

## 2024-06-12 DIAGNOSIS — B95.1 GROUP B STREPTOCOCCAL INFECTION DURING PREGNANCY (HHS-HCC): ICD-10-CM

## 2024-06-12 DIAGNOSIS — O09.91 SUPERVISION OF HIGH RISK PREGNANCY IN FIRST TRIMESTER (HHS-HCC): ICD-10-CM

## 2024-06-12 DIAGNOSIS — J45.40 MODERATE PERSISTENT ASTHMA, UNSPECIFIED WHETHER COMPLICATED (HHS-HCC): ICD-10-CM

## 2024-06-12 DIAGNOSIS — O99.210 OBESITY IN PREGNANCY (HHS-HCC): Primary | ICD-10-CM

## 2024-06-12 DIAGNOSIS — O99.210 OBESITY IN PREGNANCY (HHS-HCC): ICD-10-CM

## 2024-06-12 DIAGNOSIS — O98.819 GROUP B STREPTOCOCCAL INFECTION DURING PREGNANCY (HHS-HCC): ICD-10-CM

## 2024-06-12 DIAGNOSIS — O33.5XX1: ICD-10-CM

## 2024-06-12 DIAGNOSIS — O33.5XX0: ICD-10-CM

## 2024-06-12 DIAGNOSIS — I49.1 PAC (PREMATURE ATRIAL CONTRACTION): ICD-10-CM

## 2024-06-12 PROCEDURE — 99212 OFFICE O/P EST SF 10 MIN: CPT

## 2024-06-12 PROCEDURE — 99212 OFFICE O/P EST SF 10 MIN: CPT | Mod: TH

## 2024-06-12 ASSESSMENT — PAIN - FUNCTIONAL ASSESSMENT: PAIN_FUNCTIONAL_ASSESSMENT: 0-10

## 2024-06-12 ASSESSMENT — PAIN SCALES - GENERAL: PAINLEVEL_OUTOF10: 0 - NO PAIN

## 2024-06-12 ASSESSMENT — ENCOUNTER SYMPTOMS
DEPRESSION: 0
LOSS OF SENSATION IN FEET: 0
OCCASIONAL FEELINGS OF UNSTEADINESS: 0

## 2024-06-12 NOTE — PROGRESS NOTES
Brian Malagon is a 29 y.o.  at 37w5d with a working estimated date of delivery of 2024, by Ultrasound who presents for a routine prenatal visit.     She denies vaginal bleeding, leakage of fluid, decreased fetal movements, or contractions.    Objective   Physical Exam:   Weight: 110 kg (243 lb)  TW.224 kg (7.9 oz)  BP: 112/77  Fetal Heart Rate: NST Fundal Height (cm): 42 cm Presentation: Vertex  Dilation: Closed Effacement (%): 50 Fetal Station: -3  Postpartum Depression: Medium Risk (3/13/2024)    [place]  Depression Scale     Last EPDS Total Score: 6     Last EPDS Self Harm Result: Never        Prenatal Labs  Lab Results   Component Value Date    HGB 11.5 (L) 2024    HCT 34.3 (L) 2024     2024    SYPHT Nonreactive 2024     Lab Results   Component Value Date    GLUC1P 116 2024     Group B Strep Screen   Date Value Ref Range Status   2024 (A)  Final    Isolated: Streptococcus agalactiae (Group B Streptococcus)      Assessment/Plan   Problem List Items Addressed This Visit       Asthma ([facility])    Overview       Requiring oral steroid medication   Uses albuterol inhaler 1-2x week, notes that she has to use more frequently when she doesn't take her inhaled steroid   Denies history of intubation              Obesity in pregnancy ([facility]) - Primary    Overview     BMI = 41.61 at NOB  Fetal surveillance BMI >40 at NOB:  Growth US at 30 (43%) and 36 wks  BPP or NST weekly 34 wks to delivery    Timing of delivery: 39 0/7 - 39 6/7 wks  *BMI >= 50 at any time in pregnancy: Deliver at Level 4           Supervision of high risk pregnancy in first trimester ([facility])    Overview     Desired provider in labor: [x] CNM  [] Physician  [x] Dated by: 7w US not c/w LMP  [x] Initial BMI: 41  [x] Prenatal Labs: WNL  [x] Rh status: A+  [x] Genetic Screening:  rr cfDNA  [x] Baby ASA : rx sent 12/15    [x] Anatomy US: WNL  [x] Fetal Sex: male , yes to  circ  [] Patient added to BirthTracks  [x] 1hr GCT at 24-28wks: 3/13 WNL     [x] Tdap (27-36wks): 4/10/24  [x] Flu Shot: 12/15/23    [x] Breastfeeding: planning on it  [x] Pain management during labor:  will wait and see, would like to go natural through  [x] Postpartum Birth control method: POPs    [x] GBS at 36 wks:  collected  [x] 39 weeks discussion of IOL vs. Expectant management: IOL             PAC (premature atrial contraction)    Overview     Admitted : Workup was significant for PACs noted on EKG. Lab work was notable for hypomagnesemia and hypokalemia that was repleted. BNP, trops, and TSH were wnl.  Cardiology was formally consulted who recommended telemetry observation overnight which revealed occasional PACs and PVCs, otherwise normal sinus rhythm. Cardiology further recommended an inpatient echo to exclude structural heart disease given that patient is symptomatic and pregnant, however, patient strongly desired to return home for childcare purposes and declined further admission. Patient was discharged home on  in stable condition, An order has been placed for her to receive an echo outpatient.    ; ECHO: no structural abnormalities, left ventricular systolic function WNL. Patient denies any PACs/symptoms for at least 2 weeks since stopping unisom and all caffeine  [X] reviewed at case review; ok to stay with [facility] for care         Large fetus causing disproportion ([facility])    Overview     Size greater than dates at 32w (measuring 36w)   US @30w 32% EFW  Repeat US in 3 weeks    Growth ultrasound on ; EFW 75%, AC 76%         Group B streptococcal infection during pregnancy ([facility])    Overview     GBS+; please treat in labor           surveillance weekly for BMI  IOL scheduled   Reviewed s/sx of labor, warning signs, fetal movement counts, and when to call provider  Follow up in 1 week for a routine prenatal visit.    Irma Morin, CHAUNCEY-MAGUE

## 2024-06-12 NOTE — PROGRESS NOTES
Ms. Malagon presents for NST today for BMI >40 at 37.5wga, reports frequent FM. Denies vaginal bleeding, denies VLOF. Denies pain or recent falls. NST reactive, read by AUSTIN Morin, ,CNM.  Kacey Stevens MSN, RN, CLC    Notified pt daughter [phone] and told her to call her mothers health insurance; and ask for medical transportation. I also gave her some local number for medical transportation. And some medical transportation may require 24-72 hours report for appointments. Patient daughter states verbal understanding and has no further questions.

## 2024-06-12 NOTE — PROGRESS NOTES
Call received from OB schedulers that patient's IOL had to be changed to Monday 6/24 at 2pm due to medically indicated IOL needing scheduled  Spoke with patient in office about induction date change  Patient amendable to this date and time  [employer]hart message with new date/time sent to patient  Encouraged patient to call office with any questions or concerns  Jesenia Hamm RN

## 2024-06-19 NOTE — PROGRESS NOTES
Subjective     Eunice Malagon is a 29 y.o.  at 39w0d with a working estimated date of delivery of 2024, by Ultrasound who presents for a routine prenatal visit.     She denies vaginal bleeding, leakage of fluid, decreased fetal movements, or contractions. She had some cramping yesterday, but resolved with shower.     Patient concerned of the possibility of being being OP.  Patient also wondering about estimated fetal weight.  She states that this baby feels bigger than her last, but not significantly.    IOL .     Objective   Physical Exam:   Weight: 110 kg (242 lb)  Expected Total Weight Gain: 5 kg (11 lb)-9 kg (19 lb)   Pregravid BMI: 41.61  BP: 121/84  Fetal Heart Rate: NST Fundal Height (cm): 40 cm (Abdominal pannus increasing fundal height) Presentation: Vertex (confirmed by US, not OP on US)  Dilation: 1 Effacement (%): 50 Fetal Station: -3, soft, midposition     Problem List Items Addressed This Visit       Supervision of high risk pregnancy in first trimester ([facility])    Overview     Desired provider in labor: [x] CNM  [] Physician  [x] Dated by: 7w US not c/w LMP  [x] Initial BMI: 41  [x] Prenatal Labs: WNL  [x] Rh status: A+  [x] Genetic Screening:  rr cfDNA  [x] Baby ASA : rx sent 12/15    [x] Anatomy US: WNL  [x] Fetal Sex: male , yes to circ  [] Patient added to BirthTracks  [x] 1hr GCT at 24-28wks: 3/13 WNL     [x] Tdap (27-36wks): 4/10/24  [x] Flu Shot: 12/15/23    [x] Breastfeeding: planning on it  [x] Pain management during labor:  will wait and see, would like to go natural through  [x] Postpartum Birth control method: POPs    [x] GBS at 36 wks:  collected  [x] 39 weeks discussion of IOL vs. Expectant management: IOL              Other Visit Diagnoses       39 weeks gestation of pregnancy ([facility])    -  Primary    Supervision of other normal pregnancy, antepartum ([facility])                -Discussed expectations and methods used for IOL  -NST: reactive   -Bedside ultrasound  conducted, fetus appears to be OA on ultrasound  -Discussed with patient that regardless of fetuses presentation at the moment fetuses turned frequently  -Also discussed that most fetuses to start out labor OB turn to OA; reassurance given  -Discussed with patient about normal fundal height and that on Leopold's fetus feels to me to be 37 to 3800 g; reassurance is given  -Discussed 75% EFW at ultrasound on 5-, that it is only been 3 weeks and so a growth ultrasound would likely be of minimal significance  -Reviewed s/sx of labor, warning signs, fetal movement counts, and when to call provider  -Follow up 6/24 for IOL    ISHAN Duong

## 2024-06-21 ENCOUNTER — ROUTINE PRENATAL (OUTPATIENT)
Dept: OBSTETRICS AND GYNECOLOGY | Facility: HOSPITAL | Age: 29
End: 2024-06-21
Payer: COMMERCIAL

## 2024-06-21 ENCOUNTER — PROCEDURE VISIT (OUTPATIENT)
Dept: OBSTETRICS AND GYNECOLOGY | Facility: HOSPITAL | Age: 29
End: 2024-06-21
Payer: COMMERCIAL

## 2024-06-21 VITALS — DIASTOLIC BLOOD PRESSURE: 84 MMHG | BODY MASS INDEX: 41.54 KG/M2 | SYSTOLIC BLOOD PRESSURE: 121 MMHG | WEIGHT: 242 LBS

## 2024-06-21 DIAGNOSIS — Z34.80 SUPERVISION OF OTHER NORMAL PREGNANCY, ANTEPARTUM (HHS-HCC): ICD-10-CM

## 2024-06-21 DIAGNOSIS — O09.91 SUPERVISION OF HIGH RISK PREGNANCY IN FIRST TRIMESTER (HHS-HCC): ICD-10-CM

## 2024-06-21 DIAGNOSIS — O99.210 OBESITY IN PREGNANCY (HHS-HCC): ICD-10-CM

## 2024-06-21 DIAGNOSIS — Z36.89 NST (NON-STRESS TEST) REACTIVE (HHS-HCC): ICD-10-CM

## 2024-06-21 DIAGNOSIS — Z3A.39 39 WEEKS GESTATION OF PREGNANCY (HHS-HCC): Primary | ICD-10-CM

## 2024-06-21 PROBLEM — N96 RECURRENT PREGNANCY LOSS: Status: RESOLVED | Noted: 2022-10-21 | Resolved: 2024-06-21

## 2024-06-21 PROBLEM — O09.93 SUPERVISION OF HIGH RISK PREGNANCY IN THIRD TRIMESTER (HHS-HCC): Status: ACTIVE | Noted: 2023-11-10

## 2024-06-21 PROBLEM — O33.5XX0: Status: RESOLVED | Noted: 2024-05-08 | Resolved: 2024-06-21

## 2024-06-21 PROCEDURE — 99213 OFFICE O/P EST LOW 20 MIN: CPT | Mod: TH | Performed by: ADVANCED PRACTICE MIDWIFE

## 2024-06-21 NOTE — PROCEDURES
Eunice Malagon, a  at 39w0d with an RADHA of 2024, by Ultrasound, was seen at [facility] for a nonstress test.    Non-Stress Test   Baseline Fetal Heart Rate for Non-Stress Test: 145 BPM  Variability in Waveform for Non-Stress Test: Moderate  Accelerations in Non-Stress Test: Yes, greater than/equal to 15 bpm, lasting at least 15 seconds  Decelerations in Non-Stress Test: None  Contractions in Non-Stress Test: Irregular      ISHAN Duong

## 2024-06-24 ENCOUNTER — ANESTHESIA EVENT (OUTPATIENT)
Dept: OBSTETRICS AND GYNECOLOGY | Facility: HOSPITAL | Age: 29
End: 2024-06-24
Payer: COMMERCIAL

## 2024-06-24 ENCOUNTER — HOSPITAL ENCOUNTER (INPATIENT)
Facility: HOSPITAL | Age: 29
LOS: 2 days | Discharge: HOME | End: 2024-06-26
Attending: OBSTETRICS & GYNECOLOGY | Admitting: NURSE PRACTITIONER
Payer: COMMERCIAL

## 2024-06-24 ENCOUNTER — APPOINTMENT (OUTPATIENT)
Dept: OBSTETRICS AND GYNECOLOGY | Facility: HOSPITAL | Age: 29
End: 2024-06-24
Payer: COMMERCIAL

## 2024-06-24 ENCOUNTER — ANESTHESIA (OUTPATIENT)
Dept: OBSTETRICS AND GYNECOLOGY | Facility: HOSPITAL | Age: 29
End: 2024-06-24
Payer: COMMERCIAL

## 2024-06-24 DIAGNOSIS — Z30.011 ENCOUNTER FOR INITIAL PRESCRIPTION OF CONTRACEPTIVE PILLS: ICD-10-CM

## 2024-06-24 DIAGNOSIS — Z34.90 ENCOUNTER FOR ELECTIVE INDUCTION OF LABOR (HHS-HCC): ICD-10-CM

## 2024-06-24 LAB
ABO GROUP (TYPE) IN BLOOD: NORMAL
ANTIBODY SCREEN: NORMAL
ERYTHROCYTE [DISTWIDTH] IN BLOOD BY AUTOMATED COUNT: 13.7 % (ref 11.5–14.5)
HCT VFR BLD AUTO: 35 % (ref 36–46)
HGB BLD-MCNC: 11.8 G/DL (ref 12–16)
MCH RBC QN AUTO: 30.8 PG (ref 26–34)
MCHC RBC AUTO-ENTMCNC: 33.7 G/DL (ref 32–36)
MCV RBC AUTO: 91 FL (ref 80–100)
NRBC BLD-RTO: 0 /100 WBCS (ref 0–0)
PLATELET # BLD AUTO: 241 X10*3/UL (ref 150–450)
RBC # BLD AUTO: 3.83 X10*6/UL (ref 4–5.2)
RH FACTOR (ANTIGEN D): NORMAL
TREPONEMA PALLIDUM IGG+IGM AB [PRESENCE] IN SERUM OR PLASMA BY IMMUNOASSAY: NONREACTIVE
WBC # BLD AUTO: 10.6 X10*3/UL (ref 4.4–11.3)

## 2024-06-24 PROCEDURE — 3700000002 HC GENERAL ANESTHESIA TIME - EACH INCREMENTAL 1 MINUTE: Performed by: STUDENT IN AN ORGANIZED HEALTH CARE EDUCATION/TRAINING PROGRAM

## 2024-06-24 PROCEDURE — 2500000001 HC RX 250 WO HCPCS SELF ADMINISTERED DRUGS (ALT 637 FOR MEDICARE OP): Performed by: NURSE PRACTITIONER

## 2024-06-24 PROCEDURE — 86901 BLOOD TYPING SEROLOGIC RH(D): CPT | Performed by: NURSE PRACTITIONER

## 2024-06-24 PROCEDURE — 7210000002 HC LABOR PER HOUR

## 2024-06-24 PROCEDURE — 85027 COMPLETE CBC AUTOMATED: CPT | Performed by: NURSE PRACTITIONER

## 2024-06-24 PROCEDURE — 3700000014 EPIDURAL BLOCK: Mod: GC

## 2024-06-24 PROCEDURE — 2720000007 HC OR 272 NO HCPCS

## 2024-06-24 PROCEDURE — 86780 TREPONEMA PALLIDUM: CPT | Performed by: NURSE PRACTITIONER

## 2024-06-24 PROCEDURE — 36415 COLL VENOUS BLD VENIPUNCTURE: CPT | Performed by: NURSE PRACTITIONER

## 2024-06-24 PROCEDURE — 2500000004 HC RX 250 GENERAL PHARMACY W/ HCPCS (ALT 636 FOR OP/ED): Performed by: NURSE PRACTITIONER

## 2024-06-24 PROCEDURE — 7100000016 HC LABOR RECOVERY PER HOUR

## 2024-06-24 PROCEDURE — 2500000005 HC RX 250 GENERAL PHARMACY W/O HCPCS

## 2024-06-24 PROCEDURE — 3700000001 HC GENERAL ANESTHESIA TIME - INITIAL BASE CHARGE: Performed by: STUDENT IN AN ORGANIZED HEALTH CARE EDUCATION/TRAINING PROGRAM

## 2024-06-24 PROCEDURE — 1120000001 HC OB PRIVATE ROOM DAILY

## 2024-06-24 RX ORDER — ALBUTEROL SULFATE 90 UG/1
2 AEROSOL, METERED RESPIRATORY (INHALATION) EVERY 6 HOURS PRN
Status: DISCONTINUED | OUTPATIENT
Start: 2024-06-24 | End: 2024-06-25 | Stop reason: HOSPADM

## 2024-06-24 RX ORDER — PENICILLIN G 3000000 [IU]/50ML
3 INJECTION, SOLUTION INTRAVENOUS EVERY 4 HOURS
Status: DISCONTINUED | OUTPATIENT
Start: 2024-06-24 | End: 2024-06-25 | Stop reason: HOSPADM

## 2024-06-24 RX ORDER — CARBOPROST TROMETHAMINE 250 UG/ML
250 INJECTION, SOLUTION INTRAMUSCULAR ONCE AS NEEDED
Status: DISCONTINUED | OUTPATIENT
Start: 2024-06-24 | End: 2024-06-25 | Stop reason: HOSPADM

## 2024-06-24 RX ORDER — OXYTOCIN 10 [USP'U]/ML
10 INJECTION, SOLUTION INTRAMUSCULAR; INTRAVENOUS ONCE AS NEEDED
Status: DISCONTINUED | OUTPATIENT
Start: 2024-06-24 | End: 2024-06-25 | Stop reason: HOSPADM

## 2024-06-24 RX ORDER — LABETALOL HYDROCHLORIDE 5 MG/ML
20 INJECTION, SOLUTION INTRAVENOUS ONCE AS NEEDED
Status: DISCONTINUED | OUTPATIENT
Start: 2024-06-24 | End: 2024-06-25 | Stop reason: HOSPADM

## 2024-06-24 RX ORDER — OXYTOCIN/0.9 % SODIUM CHLORIDE 30/500 ML
2-30 PLASTIC BAG, INJECTION (ML) INTRAVENOUS CONTINUOUS
Status: DISCONTINUED | OUTPATIENT
Start: 2024-06-24 | End: 2024-06-26 | Stop reason: HOSPADM

## 2024-06-24 RX ORDER — LOPERAMIDE HYDROCHLORIDE 2 MG/1
4 CAPSULE ORAL EVERY 2 HOUR PRN
Status: DISCONTINUED | OUTPATIENT
Start: 2024-06-24 | End: 2024-06-25 | Stop reason: HOSPADM

## 2024-06-24 RX ORDER — LIDOCAINE HYDROCHLORIDE 10 MG/ML
30 INJECTION INFILTRATION; PERINEURAL ONCE AS NEEDED
Status: DISCONTINUED | OUTPATIENT
Start: 2024-06-24 | End: 2024-06-25 | Stop reason: HOSPADM

## 2024-06-24 RX ORDER — ONDANSETRON HYDROCHLORIDE 2 MG/ML
4 INJECTION, SOLUTION INTRAVENOUS EVERY 6 HOURS PRN
Status: DISCONTINUED | OUTPATIENT
Start: 2024-06-24 | End: 2024-06-25 | Stop reason: HOSPADM

## 2024-06-24 RX ORDER — SODIUM CHLORIDE, SODIUM LACTATE, POTASSIUM CHLORIDE, CALCIUM CHLORIDE 600; 310; 30; 20 MG/100ML; MG/100ML; MG/100ML; MG/100ML
125 INJECTION, SOLUTION INTRAVENOUS CONTINUOUS
Status: DISCONTINUED | OUTPATIENT
Start: 2024-06-24 | End: 2024-06-26 | Stop reason: HOSPADM

## 2024-06-24 RX ORDER — ONDANSETRON 4 MG/1
4 TABLET, FILM COATED ORAL EVERY 6 HOURS PRN
Status: DISCONTINUED | OUTPATIENT
Start: 2024-06-24 | End: 2024-06-25 | Stop reason: HOSPADM

## 2024-06-24 RX ORDER — TRANEXAMIC ACID 100 MG/ML
1000 INJECTION, SOLUTION INTRAVENOUS ONCE AS NEEDED
Status: DISCONTINUED | OUTPATIENT
Start: 2024-06-24 | End: 2024-06-25 | Stop reason: HOSPADM

## 2024-06-24 RX ORDER — HYDRALAZINE HYDROCHLORIDE 20 MG/ML
5 INJECTION INTRAMUSCULAR; INTRAVENOUS ONCE AS NEEDED
Status: DISCONTINUED | OUTPATIENT
Start: 2024-06-24 | End: 2024-06-25 | Stop reason: HOSPADM

## 2024-06-24 RX ORDER — METOCLOPRAMIDE 10 MG/1
10 TABLET ORAL EVERY 6 HOURS PRN
Status: DISCONTINUED | OUTPATIENT
Start: 2024-06-24 | End: 2024-06-25 | Stop reason: HOSPADM

## 2024-06-24 RX ORDER — TERBUTALINE SULFATE 1 MG/ML
0.25 INJECTION SUBCUTANEOUS ONCE AS NEEDED
Status: DISCONTINUED | OUTPATIENT
Start: 2024-06-24 | End: 2024-06-25 | Stop reason: HOSPADM

## 2024-06-24 RX ORDER — NIFEDIPINE 10 MG/1
10 CAPSULE ORAL ONCE AS NEEDED
Status: DISCONTINUED | OUTPATIENT
Start: 2024-06-24 | End: 2024-06-25 | Stop reason: HOSPADM

## 2024-06-24 RX ORDER — FENTANYL/BUPIVACAINE/NS/PF 2MCG/ML-.1
PLASTIC BAG, INJECTION (ML) INJECTION AS NEEDED
Status: DISCONTINUED | OUTPATIENT
Start: 2024-06-24 | End: 2024-06-25

## 2024-06-24 RX ORDER — OXYTOCIN/0.9 % SODIUM CHLORIDE 30/500 ML
60 PLASTIC BAG, INJECTION (ML) INTRAVENOUS
Status: DISCONTINUED | OUTPATIENT
Start: 2024-06-24 | End: 2024-06-25 | Stop reason: HOSPADM

## 2024-06-24 RX ORDER — METHYLERGONOVINE MALEATE 0.2 MG/ML
0.2 INJECTION INTRAVENOUS ONCE AS NEEDED
Status: DISCONTINUED | OUTPATIENT
Start: 2024-06-24 | End: 2024-06-25 | Stop reason: HOSPADM

## 2024-06-24 RX ORDER — MISOPROSTOL 200 UG/1
800 TABLET ORAL ONCE AS NEEDED
Status: DISCONTINUED | OUTPATIENT
Start: 2024-06-24 | End: 2024-06-25 | Stop reason: HOSPADM

## 2024-06-24 RX ORDER — FLUTICASONE FUROATE AND VILANTEROL 200; 25 UG/1; UG/1
1 POWDER RESPIRATORY (INHALATION)
Status: DISCONTINUED | OUTPATIENT
Start: 2024-06-24 | End: 2024-06-25 | Stop reason: HOSPADM

## 2024-06-24 RX ORDER — METOCLOPRAMIDE HYDROCHLORIDE 5 MG/ML
10 INJECTION INTRAMUSCULAR; INTRAVENOUS EVERY 6 HOURS PRN
Status: DISCONTINUED | OUTPATIENT
Start: 2024-06-24 | End: 2024-06-25 | Stop reason: HOSPADM

## 2024-06-24 RX ADMIN — Medication 2 MILLI-UNITS/MIN: at 19:41

## 2024-06-24 RX ADMIN — SODIUM CHLORIDE, POTASSIUM CHLORIDE, SODIUM LACTATE AND CALCIUM CHLORIDE 500 ML: 600; 310; 30; 20 INJECTION, SOLUTION INTRAVENOUS at 22:25

## 2024-06-24 RX ADMIN — PENICILLIN G POTASSIUM 5 MILLION UNITS: 5000000 INJECTION, POWDER, FOR SOLUTION INTRAMUSCULAR; INTRAVENOUS at 15:12

## 2024-06-24 RX ADMIN — MISOPROSTOL 25 MCG: 100 TABLET ORAL at 15:12

## 2024-06-24 RX ADMIN — PENICILLIN G 3 MILLION UNITS: 3000000 INJECTION, SOLUTION INTRAVENOUS at 23:37

## 2024-06-24 RX ADMIN — SODIUM CHLORIDE, POTASSIUM CHLORIDE, SODIUM LACTATE AND CALCIUM CHLORIDE 125 ML/HR: 600; 310; 30; 20 INJECTION, SOLUTION INTRAVENOUS at 15:12

## 2024-06-24 RX ADMIN — PENICILLIN G 3 MILLION UNITS: 3000000 INJECTION, SOLUTION INTRAVENOUS at 19:41

## 2024-06-24 SDOH — SOCIAL STABILITY: SOCIAL INSECURITY: ABUSE SCREEN: ADULT

## 2024-06-24 SDOH — SOCIAL STABILITY: SOCIAL INSECURITY: DO YOU FEEL ANYONE HAS EXPLOITED OR TAKEN ADVANTAGE OF YOU FINANCIALLY OR OF YOUR PERSONAL PROPERTY?: NO

## 2024-06-24 SDOH — HEALTH STABILITY: MENTAL HEALTH: WISH TO BE DEAD (PAST 1 MONTH): NO

## 2024-06-24 SDOH — HEALTH STABILITY: MENTAL HEALTH: WERE YOU ABLE TO COMPLETE ALL THE BEHAVIORAL HEALTH SCREENINGS?: YES

## 2024-06-24 SDOH — HEALTH STABILITY: MENTAL HEALTH: CURRENT SMOKER: 0

## 2024-06-24 SDOH — SOCIAL STABILITY: SOCIAL INSECURITY: HAS ANYONE EVER THREATENED TO HURT YOUR FAMILY OR YOUR PETS?: NO

## 2024-06-24 SDOH — SOCIAL STABILITY: SOCIAL INSECURITY: PHYSICAL ABUSE: DENIES

## 2024-06-24 SDOH — ECONOMIC STABILITY: HOUSING INSECURITY: DO YOU FEEL UNSAFE GOING BACK TO THE PLACE WHERE YOU ARE LIVING?: NO

## 2024-06-24 SDOH — SOCIAL STABILITY: SOCIAL INSECURITY: ARE YOU OR HAVE YOU BEEN THREATENED OR ABUSED PHYSICALLY, EMOTIONALLY, OR SEXUALLY BY ANYONE?: NO

## 2024-06-24 SDOH — SOCIAL STABILITY: SOCIAL INSECURITY: ARE THERE ANY APPARENT SIGNS OF INJURIES/BEHAVIORS THAT COULD BE RELATED TO ABUSE/NEGLECT?: NO

## 2024-06-24 SDOH — HEALTH STABILITY: MENTAL HEALTH: NON-SPECIFIC ACTIVE SUICIDAL THOUGHTS (PAST 1 MONTH): NO

## 2024-06-24 SDOH — SOCIAL STABILITY: SOCIAL INSECURITY: HAVE YOU HAD THOUGHTS OF HARMING ANYONE ELSE?: NO

## 2024-06-24 SDOH — SOCIAL STABILITY: SOCIAL INSECURITY: HAVE YOU HAD ANY THOUGHTS OF HARMING ANYONE ELSE?: NO

## 2024-06-24 SDOH — SOCIAL STABILITY: SOCIAL INSECURITY: VERBAL ABUSE: DENIES

## 2024-06-24 SDOH — SOCIAL STABILITY: SOCIAL INSECURITY: DOES ANYONE TRY TO KEEP YOU FROM HAVING/CONTACTING OTHER FRIENDS OR DOING THINGS OUTSIDE YOUR HOME?: NO

## 2024-06-24 SDOH — HEALTH STABILITY: MENTAL HEALTH: SUICIDAL BEHAVIOR (LIFETIME): NO

## 2024-06-24 ASSESSMENT — LIFESTYLE VARIABLES
HOW OFTEN DO YOU HAVE 6 OR MORE DRINKS ON ONE OCCASION: NEVER
AUDIT-C TOTAL SCORE: 0
HOW OFTEN DO YOU HAVE A DRINK CONTAINING ALCOHOL: NEVER
HOW MANY STANDARD DRINKS CONTAINING ALCOHOL DO YOU HAVE ON A TYPICAL DAY: PATIENT DOES NOT DRINK
SKIP TO QUESTIONS 9-10: 1
AUDIT-C TOTAL SCORE: 0

## 2024-06-24 ASSESSMENT — ACTIVITIES OF DAILY LIVING (ADL): LACK_OF_TRANSPORTATION: NO

## 2024-06-24 ASSESSMENT — PAIN SCALES - GENERAL
PAINLEVEL_OUTOF10: 0 - NO PAIN
PAINLEVEL_OUTOF10: 10 - WORST POSSIBLE PAIN
PAINLEVEL_OUTOF10: 0 - NO PAIN

## 2024-06-24 NOTE — H&P
Obstetrical Admission History and Physical     Eunice Malagon is a 29 y.o.  at 39w3d. RADHA: 2024, by Ultrasound. Estimated fetal weight: 8#. She has had prenatal care with Myrna Mayers CNM .    Assessment    Eunice Malagon is a 29 y.o.  at 39w3d. RADHA: 2024, by Ultrasound.   FHT Category 1  Unfavorable cervix, for IOL  Asthma, on dulera and albuterol inhalers  Class III Obesity  GBS pos  Plan    Admit to labor and delivery  Monitor vital signs per unit protocol  Routine labs ordered  Encourage frequent position changes  Plan for IOL with cytotec  PCN for GBS prophylaxis  Regular diet, clear liquid diet with epidural  Continuous fetal monitoring  Pain management per patient request  Continue assessment of maternal and fetal well-being  Recheck as clinically indicted by maternal or fetal status  Anticipate SVB  Dr. Borden aware of admission    Stacy Liriano, ISHAN, CHAUNCEY-CNP    Subjective     Chief Complaint: No chief complaint on file.    Eunice is here for term IOL. Good fetal movement. Denies vaginal bleeding. Denies contractions. Denies leaking of fluid.       Pregnancy Problems (from 11/10/23 to present)       Problem Noted Resolved    Encounter for induction of labor ([facility]) 11/10/2023 by ISHAN Carrillo No    Priority:  High      Overview Addendum 2024  1:53 PM by ISHAN Duong     Desired provider in labor: [x] CNM  [] Physician  [x] Dated by: 7w US not c/w LMP  [x] Initial BMI: 41  [x] Prenatal Labs: WNL  [x] Rh status: A+  [x] Genetic Screening:  rr cfDNA  [x] Baby ASA : rx sent 12/15    [x] Anatomy US: WNL  [x] Fetal Sex: male , yes to circ  [] Patient added to BirthTracks  [x] 1hr GCT at 24-28wks: 3/13 WNL     [x] Tdap (27-36wks): 4/10/24  [x] Flu Shot: 12/15/23    [x] Breastfeeding: planning on it  [x] Pain management during labor:  will wait and see, would like to go natural through  [x] Postpartum Birth control method:  POPs    [x] GBS at 36 wks:  collected  [x] 39 weeks discussion of IOL vs. Expectant management: IOL       Group B streptococcal infection during pregnancy ([facility]) 6/10/2024 by ISHAN Duong No    Priority:  Low      Overview Signed 6/10/2024  8:21 AM by ISHAN Duong     GBS+; please treat in labor       Obesity in pregnancy ([facility]) 11/10/2023 by ISHAN Carrillo No    Overview Addendum 2024 12:27 PM by ISHAN Duong     BMI = 41.61 at NOB  Fetal surveillance BMI >40 at NOB:  Growth US at 30 (43%) and 36 wks  BPP or NST weekly 34 wks to delivery    Timing of delivery: 39 0/7 - 39 6/7 wks  *BMI >= 50 at any time in pregnancy: Deliver at Level 4         Obstetrical History   OB History    Para Term  AB Living   5 1 1   3 1   SAB IAB Ectopic Multiple Live Births   3       1      # Outcome Date GA Lbr Ham/2nd Weight Sex Delivery Anes PTL Lv   5 Current            4 SAB 10/2022           3 Term 18 39w0d  3.289 kg F Vag-Spont EPI  DAVID   2 2016 11w0d             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[facility])   1 2012 21w0d       ND       Past Medical History  Past Medical History:   Diagnosis Date    Asthma ([facility])     Recurrent pregnancy loss 10/21/2022    Saw genetics         Past Surgical History   Past Surgical History:   Procedure Laterality Date    CHOLECYSTECTOMY      D&C FIRST TRIMESTER / TX INCOMPLETE / MISSED / SEPTIC / INDUCED       D&E SECOND TRIMESTER         Social History  Social History     Tobacco Use    Smoking status: Former     Types: Cigarettes    Smokeless tobacco: Never   Substance Use Topics    Alcohol use: Not Currently     Substance and Sexual Activity   Drug Use Never       Allergies  Shellfish derived, Bee venom protein (honey bee), and Fish derived     Medications  Medications Prior to Admission   Medication Sig Dispense Refill Last Dose    albuterol 2.5 mg /3 mL (0.083 %) nebulizer  solution Use 3 mL via nebulizer every 4 hours as needed for wheezing/shortness of breath.       albuterol 90 mcg/actuation inhaler Inhale 2 puffs every 6 hours if needed for wheezing or shortness of breath.       aspirin 81 mg chewable tablet Chew 1 tablet (81 mg) 2 times a day. 60 tablet 11     cetirizine (ZyrTEC) 10 mg tablet Take 1 tablet (10 mg) by mouth once daily.       doxylamine (Sleep Aid, doxylamine,) 25 mg tablet Take 0.5 tablets (12.5 mg) by mouth as needed at bedtime for sleep. 30 tablet 1     Dulera 200-5 mcg/actuation inhaler Inhale 2 puffs twice a day.       EPINEPHrine 0.3 mg/0.3 mL injection syringe Inject 0.3 mL (0.3 mg) into the muscle if needed.       prenatal vitamin, iron-folic, 27 mg iron-800 mcg folic acid tablet Take 1 tablet by mouth once daily. 30 tablet 11        Objective    Last Vitals  Temp Pulse Resp BP MAP O2 Sat   36.8 °C (98.2 °F) 97 19 126/75   98 %     Physical Examination    GENERAL: Examination reveals a well developed, well nourished, gravid female in no acute distress. She is alert and cooperative.  LUNGS: normal respiratory effort  ABDOMEN: soft, gravid, nontender, nondistended, no abnormal masses, no epigastric pain  The fetus is in a vertex presentation, determined by ultrasound  VAGINA: normal appearing vagina with normal color and discharge and no lesions noted  EXTREMITIES: no redness or tenderness in the calves or thighs, no edema  PSYCHOLOGICAL: awake and alert; oriented to person, place, and time    Fetal Monitoring      Baseline FHR: 130 per minute  Variability: moderate  Accelerations: yes  Decelerations: none  TOCO: none    Cervical Exam:  2 cm dilated, 50 effaced, -3 station, cervical position midline, consistency firm    Lab Review  Labs in chart were reviewed.

## 2024-06-24 NOTE — ANESTHESIA PREPROCEDURE EVALUATION
Patient: Eunice Malagon    Evaluation Method: In-person visit    Procedure Information    Date: 24  Procedure: Labor Consult         Relevant Problems   Anesthesia (within normal limits)      Cardiac   (+) PAC (premature atrial contraction)      Pulmonary  Pt admitted for observation x1 for asthma; no intubations. Uses albuterol 1x/week or less.   (+) Asthma ([facility])      Neuro (within normal limits)      GI (within normal limits)      /Renal  Hx Kidney stones      Liver (within normal limits)      Endocrine   (+) Obesity in pregnancy ([facility])      Hematology (within normal limits)      Musculoskeletal (within normal limits)      ID   (+) Group B streptococcal infection during pregnancy ([facility])     Vitals:    24 1721   BP: 131/84   Pulse: 83   Resp: 18   Temp: 36.8 °C (98.2 °F)   SpO2: 98%       Clinical information reviewed:   Tobacco  Allergies  Meds  Problems     Fam Hx        Denies smoking/drinking/drugs  NPO Detail:  No data recorded  Still eating at time of interview     OB/Gyn Evaluation    Present Pregnancy    Patient is pregnant now ( for term IOL).   Obstetric History                Physical Exam    Airway  Mallampati: II  TM distance: >3 FB     Cardiovascular    Dental        Pulmonary    Abdominal            Anesthesia Plan    History of general anesthesia?: yes  History of complications of general anesthesia?: no    ASA 3     epidural     The patient is not a current smoker.    Anesthetic plan and risks discussed with patient.  Use of blood products discussed with patient who consented to blood products.

## 2024-06-24 NOTE — PROGRESS NOTES
Assessment    29 y.o.  at 39w3d  FHT Category 1  Latent labor  Asthma  Class III obesity  GBS pos  Plan    Encourage frequent position changes as tolerated  Encourage ambulation as tolerated  Start pitocin per protocol (plan to start 4 hours after cyto dose)  Pain management per patient request  Continue assessment of maternal and fetal wellbeing  Recheck as clinically indicated by maternal or fetal status  Anticipate active phase of labor  Stacy Liriano, APRN-CNM, APRN-CNP    Subjective:  Eunice Malagon is patient feeling intermittent contractions, coping well.     Objective:  Fetal Monitoring      Baseline FHR: 130 per minute  Variability: moderate  Accelerations: yes  Decelerations: none  TOCO: irregular, every 2-5 minutes    Cervical Exam:  deferred    Vitals:    24 1423 24 1428 24 1433 24 1721   BP:    131/84   Pulse: 109 (!) 111 97 83   Resp:    18   Temp:    36.8 °C (98.2 °F)   TempSrc:    Temporal   SpO2: 97% 97% 98% 98%   Weight:       Height:

## 2024-06-25 PROCEDURE — 2500000004 HC RX 250 GENERAL PHARMACY W/ HCPCS (ALT 636 FOR OP/ED): Performed by: ANESTHESIOLOGIST ASSISTANT

## 2024-06-25 PROCEDURE — 2500000005 HC RX 250 GENERAL PHARMACY W/O HCPCS

## 2024-06-25 PROCEDURE — 51701 INSERT BLADDER CATHETER: CPT

## 2024-06-25 PROCEDURE — 0UQMXZZ REPAIR VULVA, EXTERNAL APPROACH: ICD-10-PCS | Performed by: ADVANCED PRACTICE MIDWIFE

## 2024-06-25 PROCEDURE — 7100000016 HC LABOR RECOVERY PER HOUR

## 2024-06-25 PROCEDURE — 2500000001 HC RX 250 WO HCPCS SELF ADMINISTERED DRUGS (ALT 637 FOR MEDICARE OP)

## 2024-06-25 PROCEDURE — 1120000001 HC OB PRIVATE ROOM DAILY

## 2024-06-25 PROCEDURE — 2500000005 HC RX 250 GENERAL PHARMACY W/O HCPCS: Performed by: ADVANCED PRACTICE MIDWIFE

## 2024-06-25 PROCEDURE — 59409 OBSTETRICAL CARE: CPT | Performed by: ADVANCED PRACTICE MIDWIFE

## 2024-06-25 PROCEDURE — 4A1H7CZ MONITORING OF PRODUCTS OF CONCEPTION, CARDIAC RATE, VIA NATURAL OR ARTIFICIAL OPENING: ICD-10-PCS | Performed by: ADVANCED PRACTICE MIDWIFE

## 2024-06-25 PROCEDURE — 10H07YZ INSERTION OF OTHER DEVICE INTO PRODUCTS OF CONCEPTION, VIA NATURAL OR ARTIFICIAL OPENING: ICD-10-PCS | Performed by: ADVANCED PRACTICE MIDWIFE

## 2024-06-25 PROCEDURE — 2500000001 HC RX 250 WO HCPCS SELF ADMINISTERED DRUGS (ALT 637 FOR MEDICARE OP): Performed by: ADVANCED PRACTICE MIDWIFE

## 2024-06-25 PROCEDURE — 7210000002 HC LABOR PER HOUR

## 2024-06-25 PROCEDURE — 2500000004 HC RX 250 GENERAL PHARMACY W/ HCPCS (ALT 636 FOR OP/ED): Performed by: NURSE PRACTITIONER

## 2024-06-25 PROCEDURE — 1100000001 HC PRIVATE ROOM DAILY

## 2024-06-25 PROCEDURE — 2500000001 HC RX 250 WO HCPCS SELF ADMINISTERED DRUGS (ALT 637 FOR MEDICARE OP): Performed by: NURSE PRACTITIONER

## 2024-06-25 RX ORDER — BISACODYL 10 MG/1
10 SUPPOSITORY RECTAL DAILY PRN
Status: DISCONTINUED | OUTPATIENT
Start: 2024-06-25 | End: 2024-06-26 | Stop reason: HOSPADM

## 2024-06-25 RX ORDER — DIPHENHYDRAMINE HCL 25 MG
25 CAPSULE ORAL EVERY 6 HOURS PRN
Status: DISCONTINUED | OUTPATIENT
Start: 2024-06-25 | End: 2024-06-26 | Stop reason: HOSPADM

## 2024-06-25 RX ORDER — LABETALOL HYDROCHLORIDE 5 MG/ML
20 INJECTION, SOLUTION INTRAVENOUS ONCE AS NEEDED
Status: DISCONTINUED | OUTPATIENT
Start: 2024-06-25 | End: 2024-06-26 | Stop reason: HOSPADM

## 2024-06-25 RX ORDER — LOPERAMIDE HYDROCHLORIDE 2 MG/1
4 CAPSULE ORAL EVERY 2 HOUR PRN
Status: DISCONTINUED | OUTPATIENT
Start: 2024-06-25 | End: 2024-06-26 | Stop reason: HOSPADM

## 2024-06-25 RX ORDER — OXYTOCIN 10 [USP'U]/ML
10 INJECTION, SOLUTION INTRAMUSCULAR; INTRAVENOUS ONCE AS NEEDED
Status: DISCONTINUED | OUTPATIENT
Start: 2024-06-25 | End: 2024-06-25

## 2024-06-25 RX ORDER — ADHESIVE BANDAGE
10 BANDAGE TOPICAL
Status: DISCONTINUED | OUTPATIENT
Start: 2024-06-25 | End: 2024-06-26 | Stop reason: HOSPADM

## 2024-06-25 RX ORDER — NIFEDIPINE 10 MG/1
10 CAPSULE ORAL ONCE AS NEEDED
Status: DISCONTINUED | OUTPATIENT
Start: 2024-06-25 | End: 2024-06-26 | Stop reason: HOSPADM

## 2024-06-25 RX ORDER — OXYTOCIN 10 [USP'U]/ML
10 INJECTION, SOLUTION INTRAMUSCULAR; INTRAVENOUS ONCE AS NEEDED
Status: DISCONTINUED | OUTPATIENT
Start: 2024-06-25 | End: 2024-06-26 | Stop reason: HOSPADM

## 2024-06-25 RX ORDER — DIPHENHYDRAMINE HYDROCHLORIDE 50 MG/ML
25 INJECTION INTRAMUSCULAR; INTRAVENOUS EVERY 6 HOURS PRN
Status: DISCONTINUED | OUTPATIENT
Start: 2024-06-25 | End: 2024-06-26 | Stop reason: HOSPADM

## 2024-06-25 RX ORDER — LIDOCAINE 560 MG/1
1 PATCH PERCUTANEOUS; TOPICAL; TRANSDERMAL
Status: DISCONTINUED | OUTPATIENT
Start: 2024-06-25 | End: 2024-06-26 | Stop reason: HOSPADM

## 2024-06-25 RX ORDER — CALCIUM CARBONATE 200(500)MG
500 TABLET,CHEWABLE ORAL 4 TIMES DAILY PRN
Status: DISCONTINUED | OUTPATIENT
Start: 2024-06-25 | End: 2024-06-26 | Stop reason: HOSPADM

## 2024-06-25 RX ORDER — CHLOROPROCAINE HYDROCHLORIDE 30 MG/ML
INJECTION, SOLUTION EPIDURAL; INFILTRATION; INTRACAUDAL; PERINEURAL AS NEEDED
Status: DISCONTINUED | OUTPATIENT
Start: 2024-06-25 | End: 2024-06-25

## 2024-06-25 RX ORDER — ONDANSETRON HYDROCHLORIDE 2 MG/ML
4 INJECTION, SOLUTION INTRAVENOUS EVERY 6 HOURS PRN
Status: DISCONTINUED | OUTPATIENT
Start: 2024-06-25 | End: 2024-06-26 | Stop reason: HOSPADM

## 2024-06-25 RX ORDER — POLYETHYLENE GLYCOL 3350 17 G/17G
17 POWDER, FOR SOLUTION ORAL 2 TIMES DAILY PRN
Status: DISCONTINUED | OUTPATIENT
Start: 2024-06-25 | End: 2024-06-26 | Stop reason: HOSPADM

## 2024-06-25 RX ORDER — METHYLERGONOVINE MALEATE 0.2 MG/ML
0.2 INJECTION INTRAVENOUS ONCE AS NEEDED
Status: DISCONTINUED | OUTPATIENT
Start: 2024-06-25 | End: 2024-06-26 | Stop reason: HOSPADM

## 2024-06-25 RX ORDER — IBUPROFEN 600 MG/1
600 TABLET ORAL EVERY 6 HOURS
Status: DISCONTINUED | OUTPATIENT
Start: 2024-06-25 | End: 2024-06-26 | Stop reason: HOSPADM

## 2024-06-25 RX ORDER — ONDANSETRON 4 MG/1
4 TABLET, FILM COATED ORAL EVERY 6 HOURS PRN
Status: DISCONTINUED | OUTPATIENT
Start: 2024-06-25 | End: 2024-06-26 | Stop reason: HOSPADM

## 2024-06-25 RX ORDER — HYDRALAZINE HYDROCHLORIDE 20 MG/ML
5 INJECTION INTRAMUSCULAR; INTRAVENOUS ONCE AS NEEDED
Status: DISCONTINUED | OUTPATIENT
Start: 2024-06-25 | End: 2024-06-26 | Stop reason: HOSPADM

## 2024-06-25 RX ORDER — OXYTOCIN/0.9 % SODIUM CHLORIDE 30/500 ML
60 PLASTIC BAG, INJECTION (ML) INTRAVENOUS ONCE AS NEEDED
Status: DISCONTINUED | OUTPATIENT
Start: 2024-06-25 | End: 2024-06-25

## 2024-06-25 RX ORDER — SIMETHICONE 80 MG
80 TABLET,CHEWABLE ORAL 4 TIMES DAILY PRN
Status: DISCONTINUED | OUTPATIENT
Start: 2024-06-25 | End: 2024-06-26 | Stop reason: HOSPADM

## 2024-06-25 RX ORDER — MISOPROSTOL 200 UG/1
800 TABLET ORAL ONCE AS NEEDED
Status: DISCONTINUED | OUTPATIENT
Start: 2024-06-25 | End: 2024-06-26 | Stop reason: HOSPADM

## 2024-06-25 RX ORDER — CARBOPROST TROMETHAMINE 250 UG/ML
250 INJECTION, SOLUTION INTRAMUSCULAR ONCE AS NEEDED
Status: DISCONTINUED | OUTPATIENT
Start: 2024-06-25 | End: 2024-06-26 | Stop reason: HOSPADM

## 2024-06-25 RX ORDER — ENOXAPARIN SODIUM 100 MG/ML
60 INJECTION SUBCUTANEOUS EVERY 24 HOURS
Status: DISCONTINUED | OUTPATIENT
Start: 2024-06-26 | End: 2024-06-26 | Stop reason: HOSPADM

## 2024-06-25 RX ORDER — ACETAMINOPHEN 325 MG/1
975 TABLET ORAL EVERY 6 HOURS
Status: DISCONTINUED | OUTPATIENT
Start: 2024-06-25 | End: 2024-06-26 | Stop reason: HOSPADM

## 2024-06-25 RX ORDER — TRANEXAMIC ACID 100 MG/ML
1000 INJECTION, SOLUTION INTRAVENOUS ONCE AS NEEDED
Status: DISCONTINUED | OUTPATIENT
Start: 2024-06-25 | End: 2024-06-26 | Stop reason: HOSPADM

## 2024-06-25 RX ADMIN — ACETAMINOPHEN 975 MG: 325 TABLET ORAL at 13:36

## 2024-06-25 RX ADMIN — PENICILLIN G 3 MILLION UNITS: 3000000 INJECTION, SOLUTION INTRAVENOUS at 04:13

## 2024-06-25 RX ADMIN — IBUPROFEN 600 MG: 600 TABLET, FILM COATED ORAL at 20:04

## 2024-06-25 RX ADMIN — IBUPROFEN 600 MG: 600 TABLET, FILM COATED ORAL at 13:36

## 2024-06-25 RX ADMIN — CALCIUM CARBONATE (ANTACID) CHEW TAB 500 MG 500 MG: 500 CHEW TAB at 05:25

## 2024-06-25 RX ADMIN — PENICILLIN G 3 MILLION UNITS: 3000000 INJECTION, SOLUTION INTRAVENOUS at 11:43

## 2024-06-25 RX ADMIN — Medication 60 MILLI-UNITS/MIN: at 12:56

## 2024-06-25 RX ADMIN — Medication 2 MILLI-UNITS/MIN: at 10:28

## 2024-06-25 RX ADMIN — ACETAMINOPHEN 975 MG: 325 TABLET ORAL at 20:04

## 2024-06-25 RX ADMIN — FLUTICASONE FUROATE AND VILANTEROL TRIFENATATE 1 PUFF: 200; 25 POWDER RESPIRATORY (INHALATION) at 07:41

## 2024-06-25 RX ADMIN — BENZOCAINE AND LEVOMENTHOL 1 APPLICATION: 200; 5 SPRAY TOPICAL at 20:00

## 2024-06-25 RX ADMIN — PENICILLIN G 3 MILLION UNITS: 3000000 INJECTION, SOLUTION INTRAVENOUS at 07:41

## 2024-06-25 RX ADMIN — WITCH HAZEL 1 EACH: 5 CLOTH TOPICAL at 20:00

## 2024-06-25 RX ADMIN — SODIUM CHLORIDE, POTASSIUM CHLORIDE, SODIUM LACTATE AND CALCIUM CHLORIDE 125 ML/HR: 600; 310; 30; 20 INJECTION, SOLUTION INTRAVENOUS at 02:37

## 2024-06-25 ASSESSMENT — PAIN SCALES - GENERAL
PAINLEVEL_OUTOF10: 0 - NO PAIN
PAINLEVEL_OUTOF10: 4
PAINLEVEL_OUTOF10: 0 - NO PAIN
PAINLEVEL_OUTOF10: 4
PAINLEVEL_OUTOF10: 0 - NO PAIN
PAINLEVEL_OUTOF10: 2

## 2024-06-25 ASSESSMENT — PAIN DESCRIPTION - DESCRIPTORS: DESCRIPTORS: ACHING;BURNING;CRAMPING

## 2024-06-25 NOTE — PROGRESS NOTES
Intrapartum Progress Note  Subjective   Eunice Malagon is a 29 y.o.  at 39w4d. RADHA: 2024, by Ultrasound. Assumed care of patient at 0700. Report given by Rachelle Willis. Pregnancy notables: BMI 42, and asthma.     Patient currently resting comfortably in bed s/p epidural.     Objective   Last Vitals:  Temp Pulse Resp BP MAP Pulse Ox   36.7 °C (98.1 °F) 100 19 115/67   97 %     FHTs: 135 baseline, moderate variability, +accels, +variable decels   UC: q2 min     Assessment   IUP at 39w4  FHTs Cat II; overall reassuring   GBS+     Plan   -continue penicillin per protocol  -continue to work on position changes  -resuscitation measures in place: pit off, IV fluid bolus going   -will plan to recheck in ~1hr ; if unchanged will plan to restart pitocin, if changing will continue expectant management   -continue to monitor FHTs and VS closely   -reassess in 1-2hrs or prn     ISHAN Duong

## 2024-06-25 NOTE — PROGRESS NOTES
Intrapartum Progress Note  Subjective   Eunice Malagon is a 29 y.o.  at 39w4d. RADHA: 2024, by Ultrasound.     Patient doing well. Feeling some rectal pressure. Amenable to SVE.     Objective   Last Vitals:  Temp Pulse Resp BP MAP Pulse Ox   36.8 °C (98.2 °F) 82 18 125/70   99 %     FHTs: 135 baseline, moderate variability, +accels, +early decels   UC: 200mvu  SVE: 9.5/100/0    Assessment   IUP at 39w4  FHTs Cat I  Pit at 4    Plan   -will move patient to left side to help that lip go away  -continue to increase pit per protocol  -continue to monitor FHTs and VS closely   -reassess in 1-2hrs or prn     ISHAN Duong

## 2024-06-25 NOTE — PROGRESS NOTES
Assessment    29 y.o.  at 39w3d  FHT Category 1  Latent labor  Asthma  Class III obesity  GBS pos    Plan    Encourage frequent position changes as tolerated  Maternal repositioning  Start/Continue pitocin per protocol  PCN GBS prophylaxis  Recheck as clinically indicated by maternal or fetal status  Anticipate active phase of labor    Rachelle Willis, APRN-CNM    Subjective:  Eunice Malagon is resting comfortably with epidural.     Objective:  Fetal Monitoring      Baseline FHR: 115 per minute  Variability: moderate  Accelerations: yes  Decelerations: early  TOCO: regular, every 1-3 minutes    Cervical Exam:  4 cm dilated, 80 effaced, -2 station    Membrane status: ruptured, clear fluid    Pitocin is at 4 mu/min.    Vitals:    24 2323 24 2328 24 2333 24 2337   BP:    122/68   Pulse: 101 81 93 82   Resp:       Temp:       TempSrc:       SpO2: 100% 100% 99%    Weight:       Height:

## 2024-06-25 NOTE — PROGRESS NOTES
Assessment    29 y.o.  at 39w4d  FHT Category 2  Latent labor  Asthma  Class III obesity  GBS pos      Plan    Pit stopped at this time for recurrent decelerations.   Maternal repositioning  restart pitocin per protocol  PCN GBS prophylaxis  Continue assessment of maternal and fetal wellbeing  Recheck as clinically indicated by maternal or fetal status  Anticipate active phase of labor    Rachelle Willis, APRN-CNM    Subjective:  Eunice Malagon is resting comfortably with epidural with FOB at bedside    Objective:  Fetal Monitoring      Baseline FHR: 130 per minute  Variability: moderate  Accelerations: yes  Decelerations: variable, late, and early  TOCO: regular, every 2-4 minutes    Cervical Exam:  6 cm dilated, 90 effaced, -1 station    Membrane status: ruptured, clear fluid    Pitocin is at 8 mu/min, turned off.     Vitals:    24 0523 24 0526 24 0635 24 0636   BP:  (!) 141/84  115/66   Pulse: 98 99 97 96   Resp:  18  18   Temp:  36.4 °C (97.5 °F)  37 °C (98.6 °F)   TempSrc:  Temporal  Temporal   SpO2: 97% 97% 97% 97%   Weight:       Height:

## 2024-06-25 NOTE — PROGRESS NOTES
Assessment    29 y.o.  at 39w4d  FHT Category 1- early decelerations with moderate variability   Latent labor  Asthma  Class III obesity  GBS pos    Plan    Maternal repositioning  IV fluid bolus given  PCN GBS prophylaxis  Continue assessment of maternal and fetal wellbeing  Recheck as clinically indicated by maternal or fetal status  Patient status and plan of care reviewed with Dr. Zhang   Anticipate active phase of labor    Rachelle Willis, APRN-CNM    Subjective:  Eunice Malagon is resting comfortably in bed. Pain is well controlled with epidural. FOB at bedside.     Objective:  Fetal Monitoring      Baseline FHR: 130 per minute  Variability: moderate  Accelerations: yes  Decelerations: early  TOCO: regular, every 2-3 minutes    Cervical Exam:  5 cm dilated, 90 effaced, -2 station    Membrane status: ruptured, clear fluid    Pitocin is at 6 mu/min.    Vitals:    24 2337 24 0030 24 0211 24 0212   BP: 122/68 100/57  102/56   Pulse: 82 86 86 86   Resp: 18 16  18   Temp: 36.4 °C (97.5 °F) 36.6 °C (97.9 °F)  36.1 °C (97 °F)   TempSrc: Temporal Temporal  Temporal   SpO2: 99% 99% 98% 98%   Weight:       Height:

## 2024-06-25 NOTE — DISCHARGE INSTRUCTIONS

## 2024-06-25 NOTE — L&D DELIVERY NOTE
OB Delivery Note  2024  Eunice Malagon  29 y.o.   Vaginal, Spontaneous        Gestational Age: 39w4d  /Para:   Quantitative Blood Loss: Admission to Discharge: 200 mL (2024  1:27 PM - 2024  2:37 PM)    Adarsh Malagon [[id number]]      Labor Events    Rupture date/time: 2024 2140  Rupture type: Spontaneous  Fluid color: Clear  Fluid odor: None  Labor type: Induced Onset of Labor  Labor allowed to proceed with plans for an attempted vaginal birth?: Yes  Induction: Oxytocin, AROM  Induction indications: Risk Reducing  Complications: None       Labor Event Times    Labor onset date/time: 2024 1327  Dilation complete date/time: 2024 1231  Start pushing date/time: 2024 1237       Placenta    Placenta delivery date/time: 2024 1254  Placenta removal: Spontaneous  Placenta appearance: Intact  Placenta disposition: discarded       Cord    Vessels: 3 vessels  Complications: None  Delayed cord clamping?: Yes  Cord blood disposition: Lab, Discarded  Gases sent?: No  Stem cell collection (by provider): No       Lacerations    Episiotomy: None  Perineal laceration: None  Labial laceration?: Yes  Labial laceration location: bilateral  Labial laceration repaired?: Yes  Repair suture: 4-0 Synthetic Suture       Anesthesia    Method: Epidural       Operative Delivery    Forceps attempted?: No  Vacuum extractor attempted?: No       Shoulder Dystocia    Shoulder dystocia present?: No        Delivery    Time head delivered: 2024 12:48:00  Birth date/time: 2024 12:48:00  Delivery type: Vaginal, Spontaneous  Complications: None       Resuscitation    Method: Tactile stimulation       Apgars    Living status: Living  Apgar Component Scores:  1 min.:  5 min.:  10 min.:  15 min.:  20 min.:    Skin color:  0  1       Heart rate:  2  2       Reflex irritability:  2  2       Muscle tone:  2  2       Respiratory effort:  2  2       Total:  8  9       Apgars assigned by:  AMOS TAYLOR       Delivery Providers    Delivering clinician: ISHAN Duong   Provider Role    Rosangela Johnston RN Delivery Nurse    Zoë Taylor RN Nursery Nurse     Resident               Patient felt lots of rectal pressure and urge to push. Found to be 10/100/0. She pushed with good effort. Head delivered, restituted to YANELI. Shoulders delivered with gentle downward traction following restitution. Postpartum pitocin bolus initiated immediately after birth. Infant placed on maternal abdomen for skin to skin. Cord clamped and cut by FOB after +1 min delay. Placenta delivered spontaneously and with gentle downward traction. Fundus palpated firm, midline, and at umbilicus. Vagina, perineum, and labia inspected. Bilateral labial lacerations repaired with 4-0 vicryl and epidural. EBL 200ml. Mom and baby stable.       ISHAN Duong

## 2024-06-25 NOTE — ANESTHESIA PROCEDURE NOTES
Epidural Block    Patient location during procedure: OB  Start time: 6/24/2024 10:35 PM  End time: 6/24/2024 10:50 PM  Reason for block: post-op pain management and labor analgesia  Staffing  Performed: resident   Authorized by: Lul López DO    Performed by: Lul López DO    Preanesthetic Checklist  Completed: patient identified, IV checked, risks and benefits discussed, surgical consent, pre-op evaluation, timeout performed and sterile techniques followed  Block Timeout  RN/Licensed healthcare professional reads aloud to the Anesthesia provider and entire team: Patient identity, procedure with side and site, patient position, and as applicable the availability of implants/special equipment/special requirements.  Patient on coagulant treatment: no  Timeout performed at: 6/24/2024 10:31 PM  Block Placement  Patient position: sitting  Prep: ChloraPrep  Sterility prep: drape, gloves, hand, mask and cap  Sedation level: no sedation  Patient monitoring: blood pressure, continuous pulse oximetry and heart rate  Approach: midline  Local numbing: lidocaine 1% to skin and subcutaneous tissues  Vertebral space: lumbar  Lumbar location: L3-L4  Epidural  Loss of resistance technique: saline  Guidance: landmark technique        Needle  Needle type: Tuohy   Needle gauge: 17  Needle length: 10 cm  Needle insertion depth: 7 cm  Catheter type: multi-orifice  Catheter size: 19 G  Catheter at skin depth: 12 cm  Catheter securement method: clear occlusive dressing    Test dose: lidocaine 1.5% with epinephrine 1-to-200,000  Test dose: lidocaine 1.5% with epinephrine 1-to-200,000  Test dose result: no positive test dose    PCEA  Medication concentration used: 0.044% Bupivacaine with 1.25 mcg/mL Fentanyl and [id number] Epinephrine  Dose (mL): 10  Lockout (minutes): 15  1-Hour Limit (boluses/hr): 4  Basal Rate: 14        Assessment  Sensory level: T10 bilateral  Events: no positive test dose  Procedure assessment: patient tolerated  procedure well with no immediate complications  Additional Notes  Lumbar epidural: Informed consent obtained.  Risks, benefits, and alternatives discussed.  Patient was placed in sitting position and ASA monitors were placed.  Timeout was performed.  The top of the iliac crest and L4 was identified.  L2-L3 space was identified and marked.  The back was prepped with chlorhexidine and sterilely draped.  1% lidocaine was injected subcutaneously around the L2-L3 space.  17-gauge Touhy needle was inserted in the the ligaments were engaged.  Loss-of-resistance with saline and glass syringe was obtained at 7 cm.  Epidural catheter was threaded without difficulty and left at 12 centimeters.  Test dose of 5 cc of 2% lidocaine with 1:200,000 epinephrine was administered via epidural catheter without significant change in heart rate, blood pressure, metallic taste in mouth, or ringing in ears.  Patient tolerated procedure well.

## 2024-06-25 NOTE — ANESTHESIA PREPROCEDURE EVALUATION
Patient: Eunice Malagon    Evaluation Method: In-person visit    Procedure Information    Date: 24  Procedure: Labor Consult         Relevant Problems   Anesthesia (within normal limits)      Cardiac   (+) PAC (premature atrial contraction)      Pulmonary  Pt admitted for observation x1 for asthma; no intubations. Uses albuterol 1x/week or less.   (+) Asthma (HHS-HCC)      Neuro (within normal limits)      GI (within normal limits)      /Renal  Hx Kidney stones      Liver (within normal limits)      Endocrine   (+) Obesity in pregnancy (HHS-HCC)      Hematology (within normal limits)      Musculoskeletal (within normal limits)      ID   (+) Group B streptococcal infection during pregnancy (HHS-HCC)     Vitals:    24 2134   BP: 116/71   Pulse: 80   Resp:    Temp:    SpO2:        Clinical information reviewed:   Tobacco  Allergies  Meds  Problems     Fam Hx        Denies smoking/drinking/drugs  NPO Detail:  No data recorded  Still eating at time of interview     OB/Gyn Evaluation    Present Pregnancy    Patient is pregnant now ( for term IOL).   Obstetric History                Physical Exam    Airway  Mallampati: II  TM distance: >3 FB     Cardiovascular    Dental        Pulmonary    Abdominal            Anesthesia Plan    History of general anesthesia?: yes  History of complications of general anesthesia?: no    ASA 3     epidural     The patient is not a current smoker.    Anesthetic plan and risks discussed with patient.  Use of blood products discussed with patient who consented to blood products.

## 2024-06-25 NOTE — PROGRESS NOTES
Intrapartum Progress Note    Assessment/Plan   Eunice Malagon is a 29 y.o.  at 39w3d. RADHA: 2024, by Ultrasound.   FHT Category 1  Latent labor  Asthma  Class III obesity  GBS pos    Plan    Encourage frequent position changes as tolerated  Start/Continue pitocin per protocol  Pain management per patient request  PCN GBS prophylaxis  Continue assessment of maternal and fetal wellbeing  Recheck as clinically indicated by maternal or fetal status  Anticipate active phase of labor    ISHAN Foote        Principal Problem:    Encounter for induction of labor ([facility])  Active Problems:    Asthma ([facility])    Obesity in pregnancy ([facility])    Group B streptococcal infection during pregnancy ([facility])    Pregnancy Problems (from 11/10/23 to present)       Problem Noted Resolved    Group B streptococcal infection during pregnancy ([facility]) 6/10/2024 by ISHAN Duong No    Overview Signed 6/10/2024  8:21 AM by ISHAN Duong     GBS+; please treat in labor         Obesity in pregnancy ([facility]) 11/10/2023 by ISHAN Carrillo No    Overview Addendum 2024 12:27 PM by ISHAN Duong     BMI = 41.61 at NOB  Fetal surveillance BMI >40 at NOB:  Growth US at 30 (43%) and 36 wks  BPP or NST weekly 34 wks to delivery    Timing of delivery: 39 0/7 - 39 6/7 wks  *BMI >= 50 at any time in pregnancy: Deliver at Level 4           Encounter for induction of labor ([facility]) 11/10/2023 by ISHAN Carrillo No    Overview Addendum 2024  1:53 PM by ISHAN Duong     Desired provider in labor: [x] CNM  [] Physician  [x] Dated by: 7w US not c/w LMP  [x] Initial BMI: 41  [x] Prenatal Labs: WNL  [x] Rh status: A+  [x] Genetic Screening:  rr cfDNA  [x] Baby ASA : rx sent 12/15    [x] Anatomy US: WNL  [x] Fetal Sex: male , yes to circ  [] Patient added to BirthTracks  [x] 1hr GCT at 24-28wks: 3/13 WNL     [x] Tdap (27-36wks):  4/10/24  [x] Flu Shot: 12/15/23    [x] Breastfeeding: planning on it  [x] Pain management during labor:  will wait and see, would like to go natural through  [x] Postpartum Birth control method: POPs    [x] GBS at 36 wks: 6/5 collected  [x] 39 weeks discussion of IOL vs. Expectant management: IOL             Large fetus causing disproportion ([facility]) 5/8/2024 by ISHAN Duong 6/21/2024 by ISHAN Duong    Overview Addendum 6/12/2024  5:07 PM by ISHAN Infante     Size greater than dates at 32w (measuring 36w)  4/19 US @30w 32% EFW  Repeat US in 3 weeks    Growth ultrasound on 5/31; EFW 75%, AC 76%         Nausea and vomiting in pregnancy prior to 22 weeks gestation ([facility]) 11/10/2023 by ISHAN Carrillo 1/17/2024 by ISHAN Duong    Overview Signed 11/10/2023  4:37 PM by ISHAN Carrillo     Rx sent for unisom and B6                 Subjective   Patient is resting comfortably, she is feeling occasional contractions, coping well.     Objective   Last Vitals:  Temp Pulse Resp BP MAP Pulse Ox   36.6 °C (97.9 °F) 80 18 116/71   99 %     Vitals Min/Max Last 24 Hours:  Temp  Min: 36.6 °C (97.9 °F)  Max: 36.8 °C (98.2 °F)  Pulse  Min: 78  Max: 113  Resp  Min: 18  Max: 19  BP  Min: 116/71  Max: 131/84    Intake/Output:    Intake/Output Summary (Last 24 hours) at 6/24/2024 2135  Last data filed at 6/24/2024 1954  Gross per 24 hour   Intake 587.5 ml   Output 450 ml   Net 137.5 ml       Physical Examination:  GENERAL: Examination reveals a well developed, well nourished, gravid female in no acute distress. She is alert and cooperative.  HEENT: External ears normal. Nose normal, no erythema or discharge.  FHR is 125 , with Accelerations, Variable decelerations, and a Category I tracing.    P[place]ra reading:  q1-3 min  The fetus is in a vertex presentation, determined by vaginal exam  CERVIX: 2 cm dilated, 60 % effaced, -3 station;  MEMBRANES are Intact  PSYCHOLOGICAL: awake and alert; oriented to person, place, and time    Lab Review:  Labs in chart were reviewed.

## 2024-06-25 NOTE — PROGRESS NOTES
Intrapartum Progress Note  Subjective   Eunice Malagon is a 29 y.o.  at 39w4d. RADHA: 2024, by Ultrasound.     Patient comfortable. Amenable to SVE. After decel amenable to FSE and then IUPC.     Objective   Last Vitals:  Temp Pulse Resp BP MAP Pulse Ox   36.7 °C (98.1 °F) 99 17 103/54   99 %     FHTs: 135 baseline, moderate variability, +accels, -decels  UC: 130 MVU  SVE: /-2, OA    Assessment   IUP at 39w4  FHTs Cat I  Unchanged exam x4 hrs  Active labor     Plan   -~6min decel; during that time patient was repositioned, Dr Farmer into room, FSE placed (but didn't work and so was removed), after recovery IUPC placed  -plan pitocin restart in 20 min if FHTs remain Cat I given inadequate contractions.   -discussed with patient about unchanged exam, need for pitocin, close monitoring of FHTs given fetus previously didn't tolerate pitocin well  -continue to monitor FHTs and VS closely   -reassess in 1-2hrs or prn     ISHAN Duong

## 2024-06-26 VITALS
SYSTOLIC BLOOD PRESSURE: 126 MMHG | RESPIRATION RATE: 18 BRPM | DIASTOLIC BLOOD PRESSURE: 75 MMHG | HEIGHT: 64 IN | OXYGEN SATURATION: 95 % | TEMPERATURE: 96.8 F | HEART RATE: 77 BPM | WEIGHT: 247.36 LBS | BODY MASS INDEX: 42.23 KG/M2

## 2024-06-26 PROCEDURE — 2500000001 HC RX 250 WO HCPCS SELF ADMINISTERED DRUGS (ALT 637 FOR MEDICARE OP): Performed by: ADVANCED PRACTICE MIDWIFE

## 2024-06-26 PROCEDURE — 2500000004 HC RX 250 GENERAL PHARMACY W/ HCPCS (ALT 636 FOR OP/ED): Performed by: ADVANCED PRACTICE MIDWIFE

## 2024-06-26 RX ORDER — NORETHINDRONE 0.35 MG/1
1 TABLET ORAL DAILY
Qty: 28 TABLET | Refills: 2 | Status: SHIPPED | OUTPATIENT
Start: 2024-06-26 | End: 2024-09-18

## 2024-06-26 RX ORDER — IBUPROFEN 600 MG/1
600 TABLET ORAL EVERY 6 HOURS
Qty: 60 TABLET | Refills: 0 | Status: SHIPPED | OUTPATIENT
Start: 2024-06-26

## 2024-06-26 RX ORDER — ACETAMINOPHEN 325 MG/1
975 TABLET ORAL EVERY 6 HOURS
Qty: 120 TABLET | Refills: 0 | Status: SHIPPED | OUTPATIENT
Start: 2024-06-26

## 2024-06-26 RX ADMIN — IBUPROFEN 600 MG: 600 TABLET, FILM COATED ORAL at 02:16

## 2024-06-26 RX ADMIN — IBUPROFEN 600 MG: 600 TABLET, FILM COATED ORAL at 08:19

## 2024-06-26 RX ADMIN — ENOXAPARIN SODIUM 60 MG: 100 INJECTION SUBCUTANEOUS at 14:07

## 2024-06-26 RX ADMIN — ACETAMINOPHEN 975 MG: 325 TABLET ORAL at 08:19

## 2024-06-26 RX ADMIN — ACETAMINOPHEN 975 MG: 325 TABLET ORAL at 02:16

## 2024-06-26 RX ADMIN — IBUPROFEN 600 MG: 600 TABLET, FILM COATED ORAL at 14:07

## 2024-06-26 RX ADMIN — ACETAMINOPHEN 975 MG: 325 TABLET ORAL at 14:07

## 2024-06-26 ASSESSMENT — PAIN DESCRIPTION - DESCRIPTORS
DESCRIPTORS: CRAMPING

## 2024-06-26 ASSESSMENT — PAIN SCALES - GENERAL
PAINLEVEL_OUTOF10: 5 - MODERATE PAIN
PAIN_LEVEL: 0
PAINLEVEL_OUTOF10: 2
PAINLEVEL_OUTOF10: 5 - MODERATE PAIN

## 2024-06-26 NOTE — LACTATION NOTE
Lactation Consultant Note  Lactation Consultation  Reason for Consult: Initial assessment  Consultant Name: Michelle Pond RN, IBCLC    Maternal Information  Has mother  before?: Yes  How long did the mother previously breastfeed?: 7 months with her first child exclusive pumping  Previous Maternal Breastfeeding Challenges: Difficult latch  Infant to breast within first 2 hours of birth?: Yes  Exclusive Pump and Bottle Feed: No    Maternal Assessment  Breast Assessment: Soft, Compressible, Filling  Nipple Assessment: Short, Erect with stimulation, Intact  Areola Assessment: Normal    Infant Assessment       Feeding Assessment  Nutrition Source: Breastmilk  Feeding Method: Nursing at the breast  Unable to assess infant feeding at this time: Infant not available due to procedure (in nursery for circumcision)    LATCH TOOL       Breast Pump       Other OB Lactation Tools  Lactation Tools: Nipple shields    Patient Follow-up  Inpatient Lactation Follow-up Needed : No  Lactation Professional - OK to Discharge: Yes    Other OB Lactation Documentation  Infant Risk Factors: Prelacteal feeds    Recommendations/Summary  Mother states that she has been trying to latch infant, but is having difficulty due to her nipples being short. She has been supplementing with formula via bottle. She had exclusively pumped for 7mo with her first child and does not want to exclusively pump again. I educated her on how to sandwich her areola for the baby to get a deep latch and to get her nipple further into baby's mouth. We also discussed using a nipple shield and mom was interested so after measuring her, I brought her in a small shield and showed her how to put it on. Encouraged her to call for assistance as needed. Outpatient lactation contact numbers given as well.

## 2024-06-26 NOTE — DISCHARGE SUMMARY
Postpartum Discharge Summary    Admission Date: 2024  Discharge Date: 24     Discharge Diagnosis  Problem List Items Addressed This Visit    None  Visit Diagnoses        (normal spontaneous vaginal delivery) ([facility])    -  Primary    Relevant Medications    acetaminophen (Tylenol) 325 mg tablet    ibuprofen 600 mg tablet    Encounter for elective induction of labor ([facility])        Relevant Orders    Labor Induction (Completed)    Encounter for initial prescription of contraceptive pills        Relevant Medications    norethindrone (Micronor) 0.35 mg tablet             Eunice Malagon is a 29 y.o. female now  and postpartum day 1      Pregnancy notable for:  Asthma  Class III obesity    Hospital Course  Admitted for term IOL  Delivery Date: 2024  12:48 PM   Delivery type: Vaginal, Spontaneous    GA at delivery: 39w4d  Outcome: Living   Anesthesia during delivery: Epidural   Intrapartum complications: None   Feeding method: Breastfeeding Status: Yes     Delivery complications: bilateral labial lacerations   Contraception at discharge: POPs    Complications Requiring Follow-Up  Elevated BP, no dx gHTN/PEC  - mild range BP x 1 during admission, o/w normotensive  - asymptomatic   - does not meet criteria for home BP monitoring or close BP follow-up     Pertinent Subjective and Physical Exam At Time of Discharge  Patient seen at bedside - doing well and no acute events overnight. Pain well-controlled on current regimen, lochia light, voiding spontaneously, passing flatus, ambulating independently, and tolerating PO.     General: well appearing, well-nourished, postpartum  Obstetric: abdomen soft/non-tender, fundus firm below umbilicus, lochia light  Skin: No rashes/lesions/erythema  Neuro: A/Ox3, conversational  GI: +flatus  Respiratory: Even and unlabored on RA  Extremities: No edema, discoloration, or pain in BLE, equal and palpable DP and PT pulses    Psych: appropriate mood and affect      Discharge Meds     Your medication list        START taking these medications        Instructions Last Dose Given Next Dose Due   acetaminophen 325 mg tablet  Commonly known as: Tylenol      Take 3 tablets (975 mg) by mouth every 6 hours.       ibuprofen 600 mg tablet      Take 1 tablet (600 mg) by mouth every 6 hours.       norethindrone 0.35 mg tablet  Commonly known as: Micronor      Take 1 tablet (0.35 mg) over 28 days by mouth once daily.              CONTINUE taking these medications        Instructions Last Dose Given Next Dose Due   albuterol 90 mcg/actuation inhaler           albuterol 2.5 mg /3 mL (0.083 %) nebulizer solution           cetirizine 10 mg tablet  Commonly known as: ZyrTEC           doxylamine 25 mg tablet  Commonly known as: Sleep Aid (doxylamine)      Take 0.5 tablets (12.5 mg) by mouth as needed at bedtime for sleep.       Dulera 200-5 mcg/actuation inhaler  Generic drug: mometasone-formoterol           EPINEPHrine 0.3 mg/0.3 mL injection syringe  Commonly known as: Epipen           prenatal vitamin (iron-folic) 27 mg iron-800 mcg folic acid tablet      Take 1 tablet by mouth once daily.              STOP taking these medications      aspirin 81 mg chewable tablet                  Where to Get Your Medications        These medications were sent to [email or web address]. [place] [id number]      Phone: [phone]   acetaminophen 325 mg tablet  ibuprofen 600 mg tablet  norethindrone 0.35 mg tablet          Test Results Pending At Discharge  Pending Labs       No current pending labs.            Outpatient Follow-Up  No future appointments.   order placed to schedule 4-6 weeks for routine postpartum visit  with Myrna Mayers CNM.    Catrina Bains PA-C  06/26/24 7:31 PM  [place]

## 2024-06-26 NOTE — CARE PLAN
The patient's goals for the shift include   Problem: Safety - Adult  Goal: Free from fall injury  Outcome: Met     Problem: Discharge Planning  Goal: Discharge to home or other facility with appropriate resources  Outcome: Met     Problem: Postpartum  Goal: Experiences normal postpartum course  Outcome: Met           VSS, bleeding and swelling minimal, pain controlled with medications, IV out, pt decline MMR vaccine (plans to receive outpatient), formula feeding.

## 2024-06-26 NOTE — ANESTHESIA POSTPROCEDURE EVALUATION
Patient: Eunice Malagon    Procedure Summary       Date: 24 Room / Location:     Anesthesia Start:  Anesthesia Stop: 24 1248    Procedure: Labor Analgesia Diagnosis:     Scheduled Providers:  Responsible Provider: Severino Ac MD    Anesthesia Type: epidural ASA Status: 3            Anesthesia Type: epidural    Eunice Malagon is a 29 y.o., , who had a Vaginal, Spontaneous  delivery on 2024  at 39w4d and is now POD1.    She had Neuraxial Anesthesia without immediate complications noted.       Pain well controlled    Vitals:    24 1138   BP: 117/77   Pulse: 75   Resp: 16   Temp: 36.4 °C (97.5 °F)   SpO2: 97%       Neuraxial site assessed. No visible redness or swelling or drainage. Patient able to ambulate and move all extremities without difficulty. Able to void. No complaints of nausea/vomiting. Tolerating PO intake well. No s/sx of PDPH.     Anesthesia will sign off     ZEYAD Salazar       Anesthesia Post Evaluation    Patient location during evaluation: bedside  Patient participation: complete - patient participated  Level of consciousness: awake and alert  Pain score: 0  Pain management: satisfactory to patient  Airway patency: patent  Cardiovascular status: stable  Respiratory status: room air  Hydration status: stable  Postoperative Nausea and Vomiting: none        No notable events documented.

## 2024-07-12 ENCOUNTER — HOSPITAL ENCOUNTER (OUTPATIENT)
Facility: HOSPITAL | Age: 29
Discharge: HOME | End: 2024-07-13
Attending: OBSTETRICS & GYNECOLOGY | Admitting: OBSTETRICS & GYNECOLOGY
Payer: COMMERCIAL

## 2024-07-12 DIAGNOSIS — N61.0 MASTITIS: Primary | ICD-10-CM

## 2024-07-12 PROCEDURE — 99214 OFFICE O/P EST MOD 30 MIN: CPT

## 2024-07-12 RX ORDER — DICLOXACILLIN SODIUM 500 MG/1
500 CAPSULE ORAL ONCE
Status: COMPLETED | OUTPATIENT
Start: 2024-07-12 | End: 2024-07-13

## 2024-07-12 RX ORDER — DICLOXACILLIN SODIUM 500 MG/1
500 CAPSULE ORAL 4 TIMES DAILY
Qty: 40 CAPSULE | Refills: 0 | Status: SHIPPED | OUTPATIENT
Start: 2024-07-12 | End: 2024-07-22

## 2024-07-12 SDOH — SOCIAL STABILITY: SOCIAL INSECURITY: ARE THERE ANY APPARENT SIGNS OF INJURIES/BEHAVIORS THAT COULD BE RELATED TO ABUSE/NEGLECT?: NO

## 2024-07-12 SDOH — SOCIAL STABILITY: SOCIAL INSECURITY: DOES ANYONE TRY TO KEEP YOU FROM HAVING/CONTACTING OTHER FRIENDS OR DOING THINGS OUTSIDE YOUR HOME?: NO

## 2024-07-12 SDOH — HEALTH STABILITY: MENTAL HEALTH: NON-SPECIFIC ACTIVE SUICIDAL THOUGHTS (PAST 1 MONTH): NO

## 2024-07-12 SDOH — SOCIAL STABILITY: SOCIAL INSECURITY: HAVE YOU HAD THOUGHTS OF HARMING ANYONE ELSE?: YES

## 2024-07-12 SDOH — SOCIAL STABILITY: SOCIAL INSECURITY: HAVE YOU HAD ANY THOUGHTS OF HARMING ANYONE ELSE?: NO

## 2024-07-12 SDOH — ECONOMIC STABILITY: HOUSING INSECURITY: DO YOU FEEL UNSAFE GOING BACK TO THE PLACE WHERE YOU ARE LIVING?: NO

## 2024-07-12 SDOH — SOCIAL STABILITY: SOCIAL INSECURITY: HAS ANYONE EVER THREATENED TO HURT YOUR FAMILY OR YOUR PETS?: NO

## 2024-07-12 SDOH — SOCIAL STABILITY: SOCIAL INSECURITY: PHYSICAL ABUSE: DENIES

## 2024-07-12 SDOH — SOCIAL STABILITY: SOCIAL INSECURITY: ABUSE SCREEN: ADULT

## 2024-07-12 SDOH — HEALTH STABILITY: MENTAL HEALTH: WERE YOU ABLE TO COMPLETE ALL THE BEHAVIORAL HEALTH SCREENINGS?: YES

## 2024-07-12 SDOH — SOCIAL STABILITY: SOCIAL INSECURITY: ARE YOU OR HAVE YOU BEEN THREATENED OR ABUSED PHYSICALLY, EMOTIONALLY, OR SEXUALLY BY ANYONE?: NO

## 2024-07-12 SDOH — HEALTH STABILITY: MENTAL HEALTH: WISH TO BE DEAD (PAST 1 MONTH): NO

## 2024-07-12 SDOH — SOCIAL STABILITY: SOCIAL INSECURITY: DO YOU FEEL ANYONE HAS EXPLOITED OR TAKEN ADVANTAGE OF YOU FINANCIALLY OR OF YOUR PERSONAL PROPERTY?: NO

## 2024-07-12 SDOH — SOCIAL STABILITY: SOCIAL INSECURITY: VERBAL ABUSE: DENIES

## 2024-07-12 SDOH — HEALTH STABILITY: MENTAL HEALTH: SUICIDAL BEHAVIOR (LIFETIME): NO

## 2024-07-12 ASSESSMENT — LIFESTYLE VARIABLES
HOW MANY STANDARD DRINKS CONTAINING ALCOHOL DO YOU HAVE ON A TYPICAL DAY: PATIENT DOES NOT DRINK
SKIP TO QUESTIONS 9-10: 1
HOW OFTEN DO YOU HAVE A DRINK CONTAINING ALCOHOL: NEVER
HOW OFTEN DO YOU HAVE 6 OR MORE DRINKS ON ONE OCCASION: NEVER
AUDIT-C TOTAL SCORE: 0
AUDIT-C TOTAL SCORE: 0

## 2024-07-12 ASSESSMENT — PAIN SCALES - GENERAL: PAINLEVEL_OUTOF10: 3

## 2024-07-12 ASSESSMENT — PATIENT HEALTH QUESTIONNAIRE - PHQ9
2. FEELING DOWN, DEPRESSED OR HOPELESS: NOT AT ALL
SUM OF ALL RESPONSES TO PHQ9 QUESTIONS 1 & 2: 0
1. LITTLE INTEREST OR PLEASURE IN DOING THINGS: NOT AT ALL

## 2024-07-13 VITALS
RESPIRATION RATE: 18 BRPM | SYSTOLIC BLOOD PRESSURE: 112 MMHG | TEMPERATURE: 97.9 F | DIASTOLIC BLOOD PRESSURE: 68 MMHG | OXYGEN SATURATION: 97 % | HEART RATE: 85 BPM

## 2024-07-13 PROCEDURE — 2500000001 HC RX 250 WO HCPCS SELF ADMINISTERED DRUGS (ALT 637 FOR MEDICARE OP)

## 2024-07-13 PROCEDURE — 99214 OFFICE O/P EST MOD 30 MIN: CPT

## 2024-07-13 NOTE — H&P
Obstetrical Admission History and Physical - OB Triage     Eunice Malagon is a 29 y.o.  who is now PPD#17 from a  presenting to OB triage for breast soreness and fever.     Patient reports onset of fever to 104.0 x2 days. She has routinely taken Tylenol with improvement in temperature. Yesterday, patient noted onset of BL breast tenderness (R>L). She exclusively pumps and increased her frequency of pumping which did not relieve soreness. She additionally states her breasts have felt warm to the touch.     Patient denies urinary or bowel symptoms. She denies sick contacts. She has had 1 episode of nausea and vomiting. She is otherwise recovering well from a postpartum perspective. 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[facility])   1 2012 21w0d       ND       Past Medical History  Past Medical History:   Diagnosis Date    Asthma ([facility])     Recurrent pregnancy loss 10/21/2022    Saw genetics         Past Surgical History   Past Surgical History:   Procedure Laterality Date    CHOLECYSTECTOMY      D&C FIRST TRIMESTER / TX INCOMPLETE / MISSED / SEPTIC / INDUCED       D&E SECOND TRIMESTER         Social History  Social History     Tobacco Use    Smoking status: Former     Types: Cigarettes    Smokeless tobacco: Never   Substance Use Topics    Alcohol use: Not Currently     Substance and Sexual Activity   Drug Use Never       Allergies  Shellfish derived, Bee venom protein (honey bee), and Fish derived     Medications  No medications prior to admission.       Objective    Last  Vitals  Temp Pulse Resp BP MAP O2 Sat   36.6 °C (97.9 °F) 85 18 112/68   97 %     Physical Examination  General: no acute distress  HEENT: normocephalic, atraumatic  CV: warm and well perfused  Lungs: breathing comfortably on room air  Abdomen: NTND. No fundal tenderness   Breast: BL breasts warm to touch, no erythema or masses noted. Non-engorged.   Extremities: moving all extremities spontaneously  Neuro: awake and conversant  Psych: appropriate mood and affect      Lab Review  Labs in chart were reviewed      Assessment & Plan   Eunice Malagon is a 29 y.o.  who is now PPD#17 from a  presenting to OB triage for breast soreness and fever.     Mastitis  - Patient with new-onset breast pain  - VSS in triage. Afebrile. Reports fever to 104F at home  - Physical exam with normal-appearing breasts; warmth noted  - Given presenting sx in addition to no other localizing infectious sx, suspect pt is developing mastitis   - Will send 10- day course of Dicloxacillin to pharmacy   - Continue Tylenol for symptom management and fever control.  - Encouraged to continue breastfeeding  - Strict return precautions given     Routine PP Care  - Patient otherwise meeting PP milestones   - Not yet scheduled for PPV, message sent to [place] staff for scheduling with Myrna Sandhu. Encouraged follow-up.      Dispo: discharge home in stable condition     Seen & dw. LILY Cortez MD  PGY-2, Obstetrics & Gynecology   [facility]

## 2024-07-13 NOTE — ED TRIAGE NOTES
2 weeks postpartum. C/o headache, fever and chills. Took her temp at home and it was 104. Took Tylenol around 2030.

## 2024-07-17 PROBLEM — B95.1 GROUP B STREPTOCOCCAL INFECTION DURING PREGNANCY (HHS-HCC): Status: RESOLVED | Noted: 2024-06-10 | Resolved: 2024-07-17

## 2024-07-17 PROBLEM — O99.210 OBESITY IN PREGNANCY (HHS-HCC): Status: RESOLVED | Noted: 2023-11-10 | Resolved: 2024-07-17

## 2024-07-17 PROBLEM — O98.819 GROUP B STREPTOCOCCAL INFECTION DURING PREGNANCY (HHS-HCC): Status: RESOLVED | Noted: 2024-06-10 | Resolved: 2024-07-17

## 2024-07-17 PROBLEM — I49.1 PAC (PREMATURE ATRIAL CONTRACTION): Status: RESOLVED | Noted: 2024-03-12 | Resolved: 2024-07-17

## 2024-07-19 PROBLEM — J45.909 ASTHMA (HHS-HCC): Status: RESOLVED | Noted: 2023-11-10 | Resolved: 2024-07-19

## 2024-07-19 PROBLEM — Z34.90 ENCOUNTER FOR INDUCTION OF LABOR (HHS-HCC): Status: RESOLVED | Noted: 2023-11-10 | Resolved: 2024-07-19

## 2024-10-02 ENCOUNTER — APPOINTMENT (OUTPATIENT)
Dept: PRIMARY CARE | Facility: CLINIC | Age: 29
End: 2024-10-02
Payer: COMMERCIAL

## 2024-10-02 ENCOUNTER — LAB (OUTPATIENT)
Dept: LAB | Facility: LAB | Age: 29
End: 2024-10-02
Payer: COMMERCIAL

## 2024-10-02 VITALS — BODY MASS INDEX: 41.88 KG/M2 | DIASTOLIC BLOOD PRESSURE: 72 MMHG | WEIGHT: 244 LBS | SYSTOLIC BLOOD PRESSURE: 112 MMHG

## 2024-10-02 DIAGNOSIS — E66.01 CLASS 3 SEVERE OBESITY WITH BODY MASS INDEX (BMI) OF 40.0 TO 44.9 IN ADULT, UNSPECIFIED OBESITY TYPE, UNSPECIFIED WHETHER SERIOUS COMORBIDITY PRESENT: ICD-10-CM

## 2024-10-02 DIAGNOSIS — J45.909 ASTHMA, UNSPECIFIED ASTHMA SEVERITY, UNSPECIFIED WHETHER COMPLICATED, UNSPECIFIED WHETHER PERSISTENT (HHS-HCC): ICD-10-CM

## 2024-10-02 DIAGNOSIS — Z00.00 HEALTHCARE MAINTENANCE: ICD-10-CM

## 2024-10-02 DIAGNOSIS — E66.813 CLASS 3 SEVERE OBESITY WITH BODY MASS INDEX (BMI) OF 40.0 TO 44.9 IN ADULT, UNSPECIFIED OBESITY TYPE, UNSPECIFIED WHETHER SERIOUS COMORBIDITY PRESENT: ICD-10-CM

## 2024-10-02 DIAGNOSIS — Z00.00 HEALTHCARE MAINTENANCE: Primary | ICD-10-CM

## 2024-10-02 DIAGNOSIS — T78.40XS ALLERGY, SEQUELA: ICD-10-CM

## 2024-10-02 LAB
ALBUMIN SERPL BCP-MCNC: 4.8 G/DL (ref 3.4–5)
ALP SERPL-CCNC: 68 U/L (ref 33–110)
ALT SERPL W P-5'-P-CCNC: 49 U/L (ref 7–45)
ANION GAP SERPL CALC-SCNC: 14 MMOL/L (ref 10–20)
AST SERPL W P-5'-P-CCNC: 32 U/L (ref 9–39)
BASOPHILS # BLD AUTO: 0.04 X10*3/UL (ref 0–0.1)
BASOPHILS NFR BLD AUTO: 0.5 %
BILIRUB SERPL-MCNC: 0.4 MG/DL (ref 0–1.2)
BUN SERPL-MCNC: 8 MG/DL (ref 6–23)
CALCIUM SERPL-MCNC: 9.5 MG/DL (ref 8.6–10.6)
CHLORIDE SERPL-SCNC: 103 MMOL/L (ref 98–107)
CHOLEST SERPL-MCNC: 174 MG/DL (ref 0–199)
CHOLESTEROL/HDL RATIO: 3.6
CO2 SERPL-SCNC: 26 MMOL/L (ref 21–32)
CREAT SERPL-MCNC: 0.76 MG/DL (ref 0.5–1.05)
EGFRCR SERPLBLD CKD-EPI 2021: >90 ML/MIN/1.73M*2
EOSINOPHIL # BLD AUTO: 0.21 X10*3/UL (ref 0–0.7)
EOSINOPHIL NFR BLD AUTO: 2.4 %
ERYTHROCYTE [DISTWIDTH] IN BLOOD BY AUTOMATED COUNT: 12.8 % (ref 11.5–14.5)
EST. AVERAGE GLUCOSE BLD GHB EST-MCNC: 85 MG/DL
GLUCOSE SERPL-MCNC: 62 MG/DL (ref 74–99)
HBA1C MFR BLD: 4.6 %
HCT VFR BLD AUTO: 41 % (ref 36–46)
HDLC SERPL-MCNC: 48.4 MG/DL
HGB BLD-MCNC: 13.3 G/DL (ref 12–16)
IMM GRANULOCYTES # BLD AUTO: 0.05 X10*3/UL (ref 0–0.7)
IMM GRANULOCYTES NFR BLD AUTO: 0.6 % (ref 0–0.9)
LDLC SERPL CALC-MCNC: 101 MG/DL
LYMPHOCYTES # BLD AUTO: 3.35 X10*3/UL (ref 1.2–4.8)
LYMPHOCYTES NFR BLD AUTO: 38 %
MCH RBC QN AUTO: 30 PG (ref 26–34)
MCHC RBC AUTO-ENTMCNC: 32.4 G/DL (ref 32–36)
MCV RBC AUTO: 92 FL (ref 80–100)
MONOCYTES # BLD AUTO: 0.57 X10*3/UL (ref 0.1–1)
MONOCYTES NFR BLD AUTO: 6.5 %
NEUTROPHILS # BLD AUTO: 4.59 X10*3/UL (ref 1.2–7.7)
NEUTROPHILS NFR BLD AUTO: 52 %
NON HDL CHOLESTEROL: 126 MG/DL (ref 0–149)
NRBC BLD-RTO: 0 /100 WBCS (ref 0–0)
PLATELET # BLD AUTO: 306 X10*3/UL (ref 150–450)
POTASSIUM SERPL-SCNC: 4.3 MMOL/L (ref 3.5–5.3)
PROT SERPL-MCNC: 7.5 G/DL (ref 6.4–8.2)
RBC # BLD AUTO: 4.44 X10*6/UL (ref 4–5.2)
SODIUM SERPL-SCNC: 139 MMOL/L (ref 136–145)
T4 FREE SERPL-MCNC: 1.21 NG/DL (ref 0.78–1.48)
TRIGL SERPL-MCNC: 122 MG/DL (ref 0–149)
TSH SERPL-ACNC: 0.54 MIU/L (ref 0.44–3.98)
VLDL: 24 MG/DL (ref 0–40)
WBC # BLD AUTO: 8.8 X10*3/UL (ref 4.4–11.3)

## 2024-10-02 PROCEDURE — 36415 COLL VENOUS BLD VENIPUNCTURE: CPT

## 2024-10-02 PROCEDURE — 80061 LIPID PANEL: CPT

## 2024-10-02 PROCEDURE — 84443 ASSAY THYROID STIM HORMONE: CPT

## 2024-10-02 PROCEDURE — 83036 HEMOGLOBIN GLYCOSYLATED A1C: CPT

## 2024-10-02 PROCEDURE — 80053 COMPREHEN METABOLIC PANEL: CPT

## 2024-10-02 PROCEDURE — 99204 OFFICE O/P NEW MOD 45 MIN: CPT | Performed by: INTERNAL MEDICINE

## 2024-10-02 PROCEDURE — 1036F TOBACCO NON-USER: CPT | Performed by: INTERNAL MEDICINE

## 2024-10-02 PROCEDURE — 84439 ASSAY OF FREE THYROXINE: CPT

## 2024-10-02 PROCEDURE — 85025 COMPLETE CBC W/AUTO DIFF WBC: CPT

## 2024-10-02 RX ORDER — CETIRIZINE HYDROCHLORIDE 10 MG/1
10 TABLET ORAL DAILY
Qty: 90 TABLET | Refills: 1 | Status: SHIPPED | OUTPATIENT
Start: 2024-10-02 | End: 2024-12-31

## 2024-10-02 RX ORDER — MOMETASONE FUROATE AND FORMOTEROL FUMARATE DIHYDRATE 200; 5 UG/1; UG/1
2 AEROSOL RESPIRATORY (INHALATION)
Qty: 13 G | Refills: 5 | Status: SHIPPED | OUTPATIENT
Start: 2024-10-02

## 2024-10-02 ASSESSMENT — PATIENT HEALTH QUESTIONNAIRE - PHQ9
SUM OF ALL RESPONSES TO PHQ9 QUESTIONS 1 AND 2: 0
2. FEELING DOWN, DEPRESSED OR HOPELESS: NOT AT ALL
1. LITTLE INTEREST OR PLEASURE IN DOING THINGS: NOT AT ALL

## 2024-10-02 NOTE — PROGRESS NOTES
Subjective   Patient ID: Eunice Malagon is a 29 y.o. female who presents for Establish Care and discuss weight loss.  HPI  Past medical history seasonal allergies asthma  Recent hospital admission ER July 12, 2024 childbirth    Overall doing well her main concern seems to be obesity uncontrolled over the last year or so grade 8 out of 10 had her son 3 months ago just cannot lose the weight she has tried everything from exercising biking walking she states her fiancé lost about 42 pounds using Ozempic she is interested in this since she cannot do it on her own  She has had some achiness of the joints and ankles since her weight is gone up over the last several months                Health Maintenance:      Colonoscopy:      Mammogram:      Pelvic/Pap:      Low dose chest CT:      Aorta duplex:      Optho:      Podiatry:        Vaccines:      Refer to Epic Vaccination Log        ROS:      General: Obesity unable to lose weight denies fever/chills/weight loss      Head: denies HA/trauma/masses/dizziness      Eyes: denies vision change/loss of vision/blurry vision/diplopia/eye pain      Ears: denies hearing loss/tinnitus/otalgia/otorrhea      Nose: denies nasal drainage/anosmia      Throat: denies dysphagia/odynophagia      Lymphatics: denies lymph node swelling      Breast: denies masses/discharge/dimpling/skin changes      Cardiac: denies CP/palpitations/orthopnea/PND      Pulmonary: denies dyspnea/cough/wheezing      GI: denies abd pain/n/v/diarrhea/melena/hematochezia/hematemesis      : denies dysuria/hematuria/change frequency      Genital: denies genital discharge/lesions      Skin: denies rashes/lesions/masses      MSK: Intermittent arthralgias the ankles and knees since having obesity denies weakness/swelling/edema/gait imbalance/pain      Neuro: denies paresthesias/seizures/dysarthria      Psych: denies depression/anxiety/suicidal or homicidal ideations            Objective   /72   Wt 111 kg (244 lb)    "BMI 41.88 kg/m²      Physical Exam:     General: AO3, NAD     Head: atraumatic/NC     Eyes: EOMI/PERRLA. Negative APD     Ears: TM pearly gray, EAC clear. No lesions or erythema     Nose: symmetric nares, no discharge     Throat: trachea midline, uvula midline pink mucosa. No thyromegaly     Lymphatics: no cervical/supraclavicular/ant or posterior cervical adenopathy/axillary/inguinal adenopathy     Breast: not examined     Chest: no deformity or tenderness to palpation     Pulm: CTA b/l, no wheeze/rhonchi/rales. nonlabored     Cardiac: RRR +s1s2, no m/r/g.      GI: soft, NT/ND. Normoactive Bsx4. No rebound/guarding.     Rectal: not examined     Genital: not examined     MSK: 5/5 strength UE LE. No edema/clubbing/cyanosis     Skin: no rashes/lesions     Vascular: 2+ palp DP PT radials b/l. Negative carotid bruit     Neuro: CNII-XII intact. No focal deficits. Reflexes 2/4 brachioradialis bicep tricep patellar achilles. Finger to nose intact.     Psych: appropriate mood/affect                    No results found for: \"BMPR1A\", \"CBCDIF\"      Assessment/Plan   Diagnoses and all orders for this visit:  Healthcare maintenance  -     CBC and Auto Differential; Future  -     Comprehensive Metabolic Panel; Future  -     Hemoglobin A1C; Future  -     Lipid Panel; Future  -     Thyroxine, Free; Future  -     Thyroid Stimulating Hormone; Future  Class 3 severe obesity with body mass index (BMI) of 40.0 to 44.9 in adult, unspecified obesity type, unspecified whether serious comorbidity present  -     semaglutide 0.25 mg or 0.5 mg (2 mg/3 mL) pen injector; Inject 0.25 mg under the skin 1 (one) time per week.  Allergy, sequela  -     cetirizine (ZyrTEC) 10 mg tablet; Take 1 tablet (10 mg) by mouth once daily.  Asthma, unspecified asthma severity, unspecified whether complicated, unspecified whether persistent ([facility])  -     Dulera 200-5 mcg/actuation inhaler; Inhale 2 puffs 2 times a day.       In my medical opinion you would " benefit from the Ozempic at this time to help optimize your weight this will help your joint pain and will also improve just your overall health  Weight loss encouraged continue to work on exercising 30 minutes a day work on a low sugar low-fat diet    Recommend colonoscopy age 40    Call and follow-up with your OB/GYN    Thank you for making appointment today rey    Please follow-up 2 months    Surinder Emmanuel DO, FACLuis Limatablish care  and discuss weight loss

## 2024-10-08 ENCOUNTER — DOCUMENTATION (OUTPATIENT)
Dept: PRIMARY CARE | Facility: CLINIC | Age: 29
End: 2024-10-08
Payer: COMMERCIAL

## 2024-10-08 NOTE — PROGRESS NOTES
DO ADRIAN Briones has already been sent in and denied          Previous Messages       ----- Message -----  From: Surinder Emmanuel DO  Sent: 10/4/2024   6:07 PM EDT  To: Rupinder Canas  Subject: adrian Dawson  I am sorry to hear that your insurance is not covering Ozempic for you, I have forwarded this message to my front office her name is Ruipnder who handles the prior authorization request hopefully that will help  In the meantime I would recommend calling your insurance to see what covered weight loss medicines that you qualify for  As a third option please forward me the fax number to the compounding pharmacy and we can try to fax the Ozempic prescription over to them if that will help you    Thank you  ===View-only below this line===      ----- Message -----       From:Eunice Malagon       Sent:10/4/2024  2:45 PM EDT         To:Patient Medical Advice Request Message List    Subject:I need Prior authorization for the ozempic prescription    Amanda Emmanuel. Unfortunately, I went to get to the ozempic prescription and they said the insurance denied it! I was wondering if there's any other options we could try or if you're able to send a prescription for the vial of the semaglutide to the compound pharmacy of [place]. This is where my fikerrie gets his prescription, and the out of pocket cost is only $160 a month including shipping.      ----- Message -----       From:Eunice Malagon       Sent:10/2/2024  4:59 PM EDT         To:Surinder Emmanuel    Subject:I need Prior authorization for the ozempic prescription    Hi I called the pharmacy and they said they need you to call my insurance company and give authorization and reason why I need the ozempic prescription before they can fill it. I was wondering if you could please do that! Thank you

## 2024-10-16 DIAGNOSIS — R74.01 ELEVATED ALT MEASUREMENT: Primary | ICD-10-CM

## 2024-10-17 ENCOUNTER — APPOINTMENT (OUTPATIENT)
Dept: PRIMARY CARE | Facility: CLINIC | Age: 29
End: 2024-10-17
Payer: COMMERCIAL

## 2024-10-25 ENCOUNTER — LAB (OUTPATIENT)
Dept: LAB | Facility: LAB | Age: 29
End: 2024-10-25
Payer: COMMERCIAL

## 2024-10-25 DIAGNOSIS — R74.01 ELEVATED ALT MEASUREMENT: ICD-10-CM

## 2024-10-25 LAB
ALBUMIN SERPL BCP-MCNC: 4.7 G/DL (ref 3.4–5)
ALP SERPL-CCNC: 67 U/L (ref 33–110)
ALT SERPL W P-5'-P-CCNC: 27 U/L (ref 7–45)
ANION GAP SERPL CALC-SCNC: 13 MMOL/L (ref 10–20)
AST SERPL W P-5'-P-CCNC: 21 U/L (ref 9–39)
BILIRUB SERPL-MCNC: 0.3 MG/DL (ref 0–1.2)
BUN SERPL-MCNC: 9 MG/DL (ref 6–23)
CALCIUM SERPL-MCNC: 9.7 MG/DL (ref 8.6–10.6)
CHLORIDE SERPL-SCNC: 104 MMOL/L (ref 98–107)
CO2 SERPL-SCNC: 28 MMOL/L (ref 21–32)
CREAT SERPL-MCNC: 0.88 MG/DL (ref 0.5–1.05)
EGFRCR SERPLBLD CKD-EPI 2021: >90 ML/MIN/1.73M*2
GLUCOSE SERPL-MCNC: 77 MG/DL (ref 74–99)
POTASSIUM SERPL-SCNC: 4.1 MMOL/L (ref 3.5–5.3)
PROT SERPL-MCNC: 7.2 G/DL (ref 6.4–8.2)
SODIUM SERPL-SCNC: 141 MMOL/L (ref 136–145)

## 2024-10-25 PROCEDURE — 36415 COLL VENOUS BLD VENIPUNCTURE: CPT

## 2024-10-25 PROCEDURE — 80053 COMPREHEN METABOLIC PANEL: CPT

## 2024-11-04 DIAGNOSIS — E66.9 OBESITY, UNSPECIFIED CLASS, UNSPECIFIED OBESITY TYPE, UNSPECIFIED WHETHER SERIOUS COMORBIDITY PRESENT: Primary | ICD-10-CM

## 2024-11-13 DIAGNOSIS — E66.9 OBESITY, UNSPECIFIED CLASS, UNSPECIFIED OBESITY TYPE, UNSPECIFIED WHETHER SERIOUS COMORBIDITY PRESENT: ICD-10-CM

## 2024-11-13 DIAGNOSIS — O09.91 SUPERVISION OF HIGH RISK PREGNANCY IN FIRST TRIMESTER (HHS-HCC): ICD-10-CM

## 2024-11-13 RX ORDER — PNV NO.95/FERROUS FUM/FOLIC AC 28MG-0.8MG
1 TABLET ORAL DAILY
Qty: 30 TABLET | Refills: 11 | Status: SHIPPED | OUTPATIENT
Start: 2024-11-13

## 2024-12-02 ENCOUNTER — APPOINTMENT (OUTPATIENT)
Dept: PRIMARY CARE | Facility: CLINIC | Age: 29
End: 2024-12-02
Payer: COMMERCIAL

## 2025-01-20 ENCOUNTER — APPOINTMENT (OUTPATIENT)
Dept: PRIMARY CARE | Facility: CLINIC | Age: 30
End: 2025-01-20
Payer: COMMERCIAL

## 2025-01-23 ENCOUNTER — APPOINTMENT (OUTPATIENT)
Dept: PRIMARY CARE | Facility: CLINIC | Age: 30
End: 2025-01-23
Payer: COMMERCIAL

## 2025-02-19 ENCOUNTER — APPOINTMENT (OUTPATIENT)
Dept: PRIMARY CARE | Facility: CLINIC | Age: 30
End: 2025-02-19
Payer: COMMERCIAL

## 2025-02-19 VITALS
WEIGHT: 222 LBS | SYSTOLIC BLOOD PRESSURE: 116 MMHG | HEIGHT: 64 IN | BODY MASS INDEX: 37.9 KG/M2 | DIASTOLIC BLOOD PRESSURE: 76 MMHG

## 2025-02-19 DIAGNOSIS — Q96.9 TURNER'S SYNDROME, UNSPECIFIED: Primary | ICD-10-CM

## 2025-02-19 DIAGNOSIS — J45.909 ASTHMA, UNSPECIFIED ASTHMA SEVERITY, UNSPECIFIED WHETHER COMPLICATED, UNSPECIFIED WHETHER PERSISTENT (HHS-HCC): ICD-10-CM

## 2025-02-19 PROCEDURE — 3008F BODY MASS INDEX DOCD: CPT | Performed by: INTERNAL MEDICINE

## 2025-02-19 PROCEDURE — 99213 OFFICE O/P EST LOW 20 MIN: CPT | Performed by: INTERNAL MEDICINE

## 2025-02-19 PROCEDURE — 1036F TOBACCO NON-USER: CPT | Performed by: INTERNAL MEDICINE

## 2025-02-19 RX ORDER — MOMETASONE FUROATE AND FORMOTEROL FUMARATE DIHYDRATE 200; 5 UG/1; UG/1
2 AEROSOL RESPIRATORY (INHALATION)
Qty: 13 G | Refills: 5 | Status: SHIPPED | OUTPATIENT
Start: 2025-02-19

## 2025-02-19 RX ORDER — ALBUTEROL SULFATE 90 UG/1
2 INHALANT RESPIRATORY (INHALATION) EVERY 6 HOURS PRN
Qty: 18 G | Refills: 5 | Status: SHIPPED | OUTPATIENT
Start: 2025-02-19

## 2025-02-19 NOTE — PROGRESS NOTES
"Subjective   Patient ID: Eunice Malagon is a 30 y.o. female who presents for Follow-up.  HPI  Past medical history seasonal allergies asthma  Recent hospital admission ER July 12, 2024 childbirth    Overall doing well her main concern seems to be obesity uncontrolled over the last year or so getting better now grade 3 out of 10 had her son 7 months she actually feels great she is lost 22 pounds over the last 4 months using Ozempic and also combining that with dieting and exercise she is working out going to the gym              Health Maintenance:      Colonoscopy:      Mammogram:      Pelvic/Pap:      Low dose chest CT:      Aorta duplex:      Optho:      Podiatry:        Vaccines:      Refer to Epic Vaccination Log        ROS:      General: Intentional weight loss denies fever/chills/weight loss      Head: denies HA/trauma/masses/dizziness      Eyes: denies vision change/loss of vision/blurry vision/diplopia/eye pain      Ears: denies hearing loss/tinnitus/otalgia/otorrhea      Nose: denies nasal drainage/anosmia      Throat: denies dysphagia/odynophagia      Lymphatics: denies lymph node swelling      Breast: denies masses/discharge/dimpling/skin changes      Cardiac: denies CP/palpitations/orthopnea/PND      Pulmonary: denies dyspnea/cough/wheezing      GI: denies abd pain/n/v/diarrhea/melena/hematochezia/hematemesis      : denies dysuria/hematuria/change frequency      Genital: denies genital discharge/lesions      Skin: denies rashes/lesions/masses      MSK: Intermittent arthralgias the ankles and knees since having obesity now improving with the weight loss denies weakness/swelling/edema/gait imbalance/pain      Neuro: denies paresthesias/seizures/dysarthria      Psych: denies depression/anxiety/suicidal or homicidal ideations            Objective   /76   Ht 1.626 m (5' 4\")   Wt 101 kg (222 lb)   BMI 38.11 kg/m²      Physical Exam:     General: AO3, NAD     Head: atraumatic/NC     Eyes: EOMI/PERRLA. " "Negative APD     Ears: TM pearly gray, EAC clear. No lesions or erythema     Nose: symmetric nares, no discharge     Throat: trachea midline, uvula midline pink mucosa. No thyromegaly     Lymphatics: no cervical/supraclavicular/ant or posterior cervical adenopathy/axillary/inguinal adenopathy     Breast: not examined     Chest: no deformity or tenderness to palpation     Pulm: CTA b/l, no wheeze/rhonchi/rales. nonlabored     Cardiac: RRR +s1s2, no m/r/g.      GI: soft, NT/ND. Normoactive Bsx4. No rebound/guarding.     Rectal: not examined     Genital: not examined     MSK: 5/5 strength UE LE. No edema/clubbing/cyanosis     Skin: no rashes/lesions     Vascular: 2+ palp DP PT radials b/l. Negative carotid bruit     Neuro: CNII-XII intact. No focal deficits. Reflexes 2/4 brachioradialis bicep tricep patellar achilles. Finger to nose intact.     Psych: appropriate mood/affect                    No results found for: \"BMPR1A\", \"CBCDIF\"      Assessment/Plan   Diagnoses and all orders for this visit:  Quiles's syndrome, unspecified  Asthma, unspecified asthma severity, unspecified whether complicated, unspecified whether persistent ([facility])  -     albuterol 90 mcg/actuation inhaler; Inhale 2 puffs every 6 hours if needed for wheezing or shortness of breath.  -     Dulera 200-5 mcg/actuation inhaler; Inhale 2 puffs 2 times a day.       In my medical opinion you would benefit from the Ozempic at this time to help optimize your weight this will help your joint pain and will also improve just your overall health  Excellent job with the weight loss continue Ozempic at the present dose  Weight loss encouraged continue to work on exercising 30 minutes a day work on a low sugar low-fat diet    Recommend colonoscopy age 40    Call and follow-up with your OB/GYN    Screening blood work due October 2025    Thank you for making appointment today rey    Please follow-up 3 months    Surinder Emmanuel DO, FACOI    Surinder Emmanuel, " DOEstablish care  and discuss weight loss

## 2025-03-12 ENCOUNTER — APPOINTMENT (OUTPATIENT)
Dept: PRIMARY CARE | Facility: CLINIC | Age: 30
End: 2025-03-12
Payer: COMMERCIAL

## 2025-03-28 ENCOUNTER — APPOINTMENT (OUTPATIENT)
Dept: OBSTETRICS AND GYNECOLOGY | Facility: CLINIC | Age: 30
End: 2025-03-28
Payer: COMMERCIAL

## 2025-04-10 NOTE — PROGRESS NOTES
[facility] Initial Patient Visit    Eunice Malagon is a 30 y.o. year old , referred by self for a sensation of bulge following vaginal delivery of baby boy on date 24.     Delivery details:   - Gestational age: 39w4d  - Delivery type:   - Infant weight: 3.92 kg  - Laceration type: bilateral labial lacerations, repaired with 4-0     Delivery/Procedure note reviewed:   Patient felt lots of rectal pressure and urge to push. Found to be 10/100/0. She pushed with good effort. Head delivered, restituted to YANELI. Shoulders delivered with gentle downward traction following restitution. Postpartum pitocin bolus initiated immediately after birth. Infant placed on maternal abdomen for skin to skin. Cord clamped and cut by FOB after +1 min delay. Placenta delivered spontaneously and with gentle downward traction. Fundus palpated firm, midline, and at umbilicus. Vagina, perineum, and labia inspected. Bilateral labial lacerations repaired with 4-0 vicryl and epidural. EBL 200ml. Mom and baby stable.         Postpartum recovery:  - Vaginal pain? ***  - Rectal pain? ***  - For pain: ***  - For bowels: ***  - Urinary incontinence? {yes,no:}  - Bowel leakage? {yes,no:}  - Returned to intercourse? {yes,no:}  - Formula/breast feeding {[facility] breast or bottle:86625}  - Mood: ***    PHYSICAL EXAMINATION:  No LMP recorded.  There is no height or weight on file to calculate BMI.  There were no vitals taken for this visit.    General Appearance: well appearing  Neuro: Alert and oriented   HEENT: mucous membranes moist, neck supple  Resp: No respiratory distress, normal work of breathing  MSK: normal range of motion, gait appropriate      Pelvic:  Chaperone for pelvic exam:   Genitourinary:  normal external genitalia, [place]th[place]'s glands, [place]'s glands negative,   Urethra normal meatus, non-tender, no periurethral mass  Vaginal mucosa  normal  Cervix  not assessed  Uterus not assessed  Adnexae not assessed    Perineum:  well healed laceration, well approximated, no evidence of suture bridging, no fistulous connection noted ***    CST {POSITIVE/NEGATIVE:10912}  Pelvic floor muscle contraction  ***/5    POP-Q (in supine position):       Aa ***     Ba ***     C ***              gh ***     pb ***     tvl ***              Ap ***     Bp ***     D ***    Rectal: no hemorrhoids, fissures or masses. Normal squeeze strength and normal resting tone. No evidence of suture transgression, wound breakdown or fistulous connection with vagina.***    PVR (by ultrasound): ***      IMPRESSION AND PLAN:  Eunice Malagon is a 30 y.o. year old, here for evaluation for sensation of prolapse in postpartum period, delivery 24    *** degree laceration  - we reviewed the pathology and natural history of OASI, specifically increased risk for wound complications and pelvic floor dysfunction/bowel control difficulty later in life  - reviewed purpose of [facility] clinic to closely monitor wound healing and identify issues early before they progress  - reviewed recommendations for bowel regimen, titrating to daily soft BM  - discussed sitz bath strategy (one inch of plain water in tub/cookie sheet 5 minutes TID)  - ***healing appropriately on exam today  - discussed plan for follow up, anticipate next visit ***4 weeks and likely cleared from further evaluation at that time if no wound healing issues identified prior to then  - reviewed warning signs: worsening pain, foul smelling discharge, mandeep fecal incontinence, etc  - *** counseled on delivery planning for future deliveries and risks/benefits of vaginal versus  delivery. Reviewed if any issues with bowel control prior to next pregnancy would likely recommend  delivery.    ***PFPT recommendation discussed and referral placed today.     All questions and concerns were answered and addressed.  The patient expressed understanding and agrees with the plan.

## 2025-04-11 ENCOUNTER — APPOINTMENT (OUTPATIENT)
Dept: OBSTETRICS AND GYNECOLOGY | Facility: CLINIC | Age: 30
End: 2025-04-11
Payer: COMMERCIAL

## 2025-04-11 DIAGNOSIS — E66.9 OBESITY, UNSPECIFIED CLASS, UNSPECIFIED OBESITY TYPE, UNSPECIFIED WHETHER SERIOUS COMORBIDITY PRESENT: ICD-10-CM

## 2025-04-23 DIAGNOSIS — E66.9 OBESITY, UNSPECIFIED CLASS, UNSPECIFIED OBESITY TYPE, UNSPECIFIED WHETHER SERIOUS COMORBIDITY PRESENT: Primary | ICD-10-CM

## 2025-05-21 ENCOUNTER — APPOINTMENT (OUTPATIENT)
Dept: PRIMARY CARE | Facility: CLINIC | Age: 30
End: 2025-05-21
Payer: COMMERCIAL

## 2025-07-28 ENCOUNTER — APPOINTMENT (OUTPATIENT)
Dept: PRIMARY CARE | Facility: CLINIC | Age: 30
End: 2025-07-28
Payer: COMMERCIAL

## 2025-07-28 VITALS
WEIGHT: 217 LBS | SYSTOLIC BLOOD PRESSURE: 110 MMHG | DIASTOLIC BLOOD PRESSURE: 68 MMHG | HEIGHT: 64 IN | BODY MASS INDEX: 37.05 KG/M2

## 2025-07-28 DIAGNOSIS — H53.8 BLURRY VISION: ICD-10-CM

## 2025-07-28 DIAGNOSIS — Z00.00 HEALTHCARE MAINTENANCE: Primary | ICD-10-CM

## 2025-07-28 DIAGNOSIS — E66.9 OBESITY, UNSPECIFIED CLASS, UNSPECIFIED OBESITY TYPE, UNSPECIFIED WHETHER SERIOUS COMORBIDITY PRESENT: ICD-10-CM

## 2025-07-28 PROCEDURE — 99395 PREV VISIT EST AGE 18-39: CPT | Performed by: INTERNAL MEDICINE

## 2025-07-28 PROCEDURE — 3008F BODY MASS INDEX DOCD: CPT | Performed by: INTERNAL MEDICINE

## 2025-07-28 NOTE — PROGRESS NOTES
"Subjective   Patient ID: Eunice Malagon is a 30 y.o. female who presents for Follow-up.  HPI  Past medical history seasonal allergies asthma  Recent hospital admission ER July 12, 2024 childbirth    Presents with daughter Clem age 7, also has a son age 1    Patient overall doing very well obesity now losing weight down from 244 in the fall 2024 now 212 she is tolerating Ozempic very well combining with dieting and exercise more active hiking            Health Maintenance:      Colonoscopy:      Mammogram:      Pelvic/Pap:      Low dose chest CT:      Aorta duplex:      Optho:      Podiatry:        Vaccines:      Refer to Epic Vaccination Log        ROS:      General: Intentional weight loss persists more energy denies fever/chills/weight loss      Head: denies HA/trauma/masses/dizziness      Eyes: denies vision change/loss of vision/blurry vision/diplopia/eye pain      Ears: denies hearing loss/tinnitus/otalgia/otorrhea      Nose: denies nasal drainage/anosmia      Throat: denies dysphagia/odynophagia      Lymphatics: denies lymph node swelling      Breast: denies masses/discharge/dimpling/skin changes      Cardiac: denies CP/palpitations/orthopnea/PND      Pulmonary: Dyspnea related to asthma also much more improved with the Ozempic denies dyspnea/cough/wheezing      GI: denies abd pain/n/v/diarrhea/melena/hematochezia/hematemesis      : denies dysuria/hematuria/change frequency      Genital: denies genital discharge/lesions      Skin: denies rashes/lesions/masses      MSK: Intermittent arthralgias the ankles and knees since having obesity now improving significantly with the weight loss denies weakness/swelling/edema/gait imbalance/pain      Neuro: denies paresthesias/seizures/dysarthria      Psych: denies depression/anxiety/suicidal or homicidal ideations            Objective   /68   Ht 1.626 m (5' 4\")   Wt 98.4 kg (217 lb)   BMI 37.25 kg/m²      Physical Exam:     General: AO3, NAD     Head: " "atraumatic/NC     Eyes: EOMI/PERRLA. Negative APD     Ears: TM pearly gray, EAC clear. No lesions or erythema     Nose: symmetric nares, no discharge     Throat: trachea midline, uvula midline pink mucosa. No thyromegaly     Lymphatics: no cervical/supraclavicular/ant or posterior cervical adenopathy/axillary/inguinal adenopathy     Breast: not examined     Chest: no deformity or tenderness to palpation     Pulm: CTA b/l, no wheeze/rhonchi/rales. nonlabored     Cardiac: RRR +s1s2, no m/r/g.      GI: soft, NT/ND. Normoactive Bsx4. No rebound/guarding.     Rectal: not examined     Genital: not examined     MSK: 5/5 strength UE LE. No edema/clubbing/cyanosis     Skin: no rashes/lesions     Vascular: 2+ palp DP PT radials b/l. Negative carotid bruit     Neuro: CNII-XII intact. No focal deficits. Reflexes 2/4 brachioradialis bicep tricep patellar achilles. Finger to nose intact.     Psych: appropriate mood/affect                    No results found for: \"BMPR1A\", \"CBCDIF\"      Assessment/Plan   Diagnoses and all orders for this visit:  Healthcare maintenance  -     CBC and Auto Differential; Future  -     Comprehensive Metabolic Panel; Future  -     Hemoglobin A1C; Future  -     Lipid Panel; Future  -     Thyroxine, Free; Future  -     Thyroid Stimulating Hormone; Future  Obesity, unspecified class, unspecified obesity type, unspecified whether serious comorbidity present  -     semaglutide (OZEMPIC) 1 mg/dose (4 mg/3 mL) pen injector; Inject 1 mg under the skin 1 (one) time per week.  -     CBC and Auto Differential; Future  -     Comprehensive Metabolic Panel; Future  -     Hemoglobin A1C; Future  -     Lipid Panel; Future  -     Thyroxine, Free; Future  -     Thyroid Stimulating Hormone; Future  Blurry vision  Comments:  Presbyopia versus other such as astigmatism  Orders:  -     CBC and Auto Differential; Future  -     Comprehensive Metabolic Panel; Future  -     Hemoglobin A1C; Future  -     Lipid Panel; Future  -   "   Thyroxine, Free; Future  -     Thyroid Stimulating Hormone; Future  -     Referral to Ophthalmology; Future       In my medical opinion you would benefit from the Ozempic at this time to help optimize your weight this will help your joint pain and will also improve just your overall health  Keep up the excellent job!  Excellent job with the weight loss continue Ozempic at the present dose  Weight loss encouraged continue to work on exercising 30 minutes a day work on a low sugar low-fat diet    Recommend colonoscopy age 40    Call and follow-up with your OB/GYN    Screening blood work due October 2025    Thank you for making appointment today rey    Please stop at the  to schedule follow-up 3 months as we discussed    Surinder Emmanuel DO, FACLuis Limatablish care  and discuss weight loss

## 2025-09-30 ENCOUNTER — APPOINTMENT (OUTPATIENT)
Dept: OBSTETRICS AND GYNECOLOGY | Facility: CLINIC | Age: 30
End: 2025-09-30
Payer: COMMERCIAL

## 2025-10-27 ENCOUNTER — APPOINTMENT (OUTPATIENT)
Dept: PRIMARY CARE | Facility: CLINIC | Age: 30
End: 2025-10-27
Payer: COMMERCIAL